# Patient Record
Sex: FEMALE | Race: BLACK OR AFRICAN AMERICAN | Employment: FULL TIME | URBAN - METROPOLITAN AREA
[De-identification: names, ages, dates, MRNs, and addresses within clinical notes are randomized per-mention and may not be internally consistent; named-entity substitution may affect disease eponyms.]

---

## 2021-02-02 NOTE — PERIOP NOTES
[facility]  Preoperative Instructions        Surgery Date 2/12/21          Time of Arrival 1200    1. On the day of your surgery, please report to the Surgical Services Registration Desk and sign in at your designated time. The Surgery Center is located to the right of the Emergency Room. 2. You must have someone with you to drive you home. You should not drive a car for 24 hours following surgery. Please make arrangements for a friend or family member to stay with you for the first 24 hours after your surgery. 3. Do not have anything to eat or drink (including water, gum, mints, coffee, juice) after midnight ? 2/11/21? Jovanni Escalante ? This may not apply to medications prescribed by your physician. ?(Please note below the special instructions with medications to take the morning of your procedure.)    4. We recommend you do not drink any alcoholic beverages for 24 hours before and after your surgery. 5. Contact your surgeons office for instructions on the following medications: non-steroidal anti-inflammatory drugs (i.e. Advil, Aleve), vitamins, and supplements. (Some surgeons will want you to stop these medications prior to surgery and others may allow you to take them)  **If you are currently taking Plavix, Coumadin, Aspirin and/or other blood-thinning agents, contact your surgeon for instructions. ** Your surgeon will partner with the physician prescribing these medications to determine if it is safe to stop or if you need to continue taking. Please do not stop taking these medications without instructions from your surgeon    6. Wear comfortable clothes. Wear glasses instead of contacts. Do not bring any money or jewelry. Please bring picture ID, insurance card, and any prearranged co-payment or hospital payment. Do not wear make-up, particularly mascara the morning of your surgery. Do not wear nail polish, particularly if you are having foot /hand surgery.   Wear your hair loose or down, no ponytails, buns, marquita pins or clips. All body piercings must be removed. Please shower with antibacterial soap for three consecutive days before and on the morning of surgery, but do not apply any lotions, powders or deodorants after the shower on the day of surgery. Please use a fresh towels after each shower. Please sleep in clean clothes and change bed linens the night before surgery. Please do not shave for 48 hours prior to surgery. Shaving of the face is acceptable. 7. You should understand that if you do not follow these instructions your surgery may be cancelled. If your physical condition changes (I.e. fever, cold or flu) please contact your surgeon as soon as possible. 8. It is important that you be on time. If a situation occurs where you may be late, please call [phone] (OR Holding Area). 9. If you have any questions and or problems, please call [phone] (Pre-admission Testing). 10. Your surgery time may be subject to change. You will receive a phone call the evening prior if your time changes. 11.  If having outpatient surgery, you must have someone to drive you here, stay with you during the duration of your stay, and to drive you home at time of discharge. Special Instructions: covid test 2/8 7-11:45 am    TAKE ALL MEDICATIONS DAY OF SURGERY EXCEPT:  n/a    I understand a pre-operative phone call will be made to verify my surgery time. In the event that I am not available, I give permission for a message to be left on my answering service and/or with another person?   Yes [phone]         ___________________      __________   _________    (Signature of Patient)             (Witness)                (Date and Time)

## 2021-02-08 ENCOUNTER — HOSPITAL ENCOUNTER (OUTPATIENT)
Dept: PREADMISSION TESTING | Age: 31
Discharge: HOME | End: 2021-02-08
Payer: COMMERCIAL

## 2021-02-08 LAB — SARS-COV-2, COV2: NORMAL

## 2021-02-08 PROCEDURE — U0003 INFECTIOUS AGENT DETECTION BY NUCLEIC ACID (DNA OR RNA); SEVERE ACUTE RESPIRATORY SYNDROME CORONAVIRUS 2 (SARS-COV-2) (CORONAVIRUS DISEASE [COVID-19]), AMPLIFIED PROBE TECHNIQUE, MAKING USE OF HIGH THROUGHPUT TECHNOLOGIES AS DESCRIBED BY CMS-2020-01-R: HCPCS

## 2021-02-09 LAB — SARS-COV-2, COV2NT: NOT DETECTED

## 2021-02-11 NOTE — H&P
Pre-operative Evaluation / History & Physical    Sent From: Sent To: [facility]  [address], [address]  Phone: [phone] Fax: [phone]      Patient Information  Patient Name Lance Bosch Sex F    1990 Age 32yo   Address [address] [place] [address] Phone H: [phone]  M: [phone]   Primary Insurance [facility] (PP[place])  ID: [id number]  Group: [id number]  Policy [place]: Soy COWAN None recorded. Pre-Op Visit and Medical History  Chief Complaint GYN problem    Pre-op visit   History of Present Illness Rm 8 27year old  presents for pre-op visit. Laparoscopic myomectomy with contained morcellation sched for 21. Pain, pressure, dyspareunia. 4 subserosal and pedunculated fibroids noted. largest fibroids 5.4 cms  Desires future fertility. Has some clotting during menses but they are otherwise not heavy or bothersome. Pain and presure are the more worrisome symptoms. Past Medical History Discussed Past Medical History  Other: Y - Fibroids  Gastrointestinal Disease: Y - Gastritis-gall bladder removed   Surgical History Reviewed Surgical History     Cholecystectomy   Gynecological / Obstetrical History Reviewed GYN History  Date of LMP: 2021. Frequency of Cycle (Q days): (Notes: irregular). Duration of Flow (days): 5. Flow: Moderate (Notes: varies-mostly \"medium flow\" -c/o clots). Menses Monthly: Y. Menstrual Cramps: severe. Date of Last Pap Smear: 2020 (Notes: NIL GC/C Neg around 2019 w/previous gyn [facility]). Date of HPV testin2020 (Notes: Neg). HPV testing results: Negative. Abnormal Pap: N. Age at Menarche: 6. HPV Vaccine: Y (Notes: Complete per pt). Sexually Active?: Y.  STIs/STDs: N.  Current Birth Control Method: Seeking Pregnancy. Endometriosis: N. Fibroids: Y. Ovarian Cyst: Y.   PCOS: N.   Social History Discussed Social History  OB/GYN Social History  Chewing tobacco: none  Tobacco Smoking Status: Never smoker  Non-smoker  Smokeless Tobacco Status: Never used smokeless tobacco  Tobacco-years of use: 0  E-cigarette/Vape Status: Never used electronic cigarettes  Most Recent Tobacco Use Screenin2020  Marital status: Single (Notes: in a relationship)  Number of children: 0  Are you working: Yes  Occupation: works from [employer]  How often do you have a DRINK containing ALCOHOL?: 2-3 times a week (Notes: yes-social drinker/wine 2x weekly)  Illicit drugs: denies   Family History Discussed Family History    Maternal Grandmother - Malignant tumor of breast  - Diagnosis age: 63's   Maternal Aunt - Malignant tumor of breast  - Diagnosis age: mid 42's   Father - Malignant neoplastic disease  - -unsure of type of cancer   Mother - Uterine leiomyoma  - hyst performed   Sister - Uterine leiomyoma  - older sister-hyst performed      Allergies List Reviewed Allergies     NKDA      Medications No medications reported   Review of Systems ROS as noted in the HPI   Vital Signs Ht: 5 ft 1 in (154.94 cm) 2021 09:05 am Wt: 196 lbs Stated (88.9 kg) 2021 09:08 am BMI: 37 2021 09:08 am   BP: 138/80 sitting L arm 2021 09:10 am          Physical Exam Patient is a 72-year-old female. Constitutional: General Appearance: well developed and nourished and pleasant. Level of Distress: no acute distress. Ambulation: ambulating normally. Head: Head: normal scalp and examination of the face and normocephalic, atraumatic, and no temporal wasting. Eyes: External Eye no discharge or eye discharge, proptosis, or ptosis. Sclera: non-icteric. Extraocular Movements extraocular movements intact. Ears, Nose, Mouth, Throat: Ears normal hearing. Nose: no external nose lesions. Neck: Appearance trachea midline. Thyroid: no enlargement. Lungs / Chest: Respiratory effort: unlabored.  Auscultation: no wheezing, rales/crackles, or rhonchi. Cardiovascular: Rate And Rhythm: regular. Heart Sounds: no murmurs. Extremities: no clubbing, cyanosis, or edema. Abdomen: Inspection and Palpation: no tenderness, guarding, masses, or rebound tenderness and soft and non-distended. Hernia: none palpable. Lymphatics: General Lymphatics: no lymphadenopathy. Extremities: Extremities: no swelling or inflammation of extremities. Musculoskeletal System: General Musculoskeletal full range of motion. Skin: General Skin no rash or suspicious lesions. Neurologic: Gait and Station: normal gait and station. Cranial Nerves: grossly intact. Mental Status Exam: Orientation oriented to person, place, and time. Affect: appropriate affect. Language and Speech: normal speech and comprehension. Lab Results    Assessment and Plan 1. Uterine leiomyoma -  We discussed at length her fibroid burden, the mechanical symptoms they are causing, and possible options for therapy, including embolization, Acessa procedure, and myomectomy, which is ultimately what she would like to pursue. I reviewed with the patient indications, alternatives, risks, and benefits of laparoscopic myomectomy, the risks of which include injury to bowel, bladder, nerves, blood vessels, or ureters, injury to any other intraabdominal structure, risk of bleeding and infection, and inherent risks of anesthesia, including death. The patient indicates understanding of these risks, and agrees to the proposed procedure. She is instructed to stay NPO after midnight the night before surgery. We discussed the size of her fibroids and the possible need for morcellation, We discussed the risk of occult sarcoma being 1 in 350 to 1 in 1000. We discussed the technique of contained morcellation and that there is some developing data suggesting this mitigates the risk of dissemination, and that there are FDA approved products for this indication now.   D25.9: Leiomyoma of uterus, unspecified      Return to Office   Precious Hardwick MD for Surgery at [facility] (OP) on 02/12/2021 at 02:00 PM   Precious Hardwick MD for Established Patient at [facility] [facility] on 03/17/2021 at 09:30 AM   Current Problems (Diagnoses) Reviewed Problems     Uterine leiomyoma - Onset: 12/01/2020    laparoscopic myomectomy with contained morcellation / 2- 2pm [facility] main / [place] / [id number] to call pt for PAT / covid testing         Electronically Signed by: Seble Mcgee MD    _____________________________________________  Ordered/Documented by:  Visit Date: 02/03/2021   The History and Physical is reviewed today. The patient is seen and examined and no changes are required.   Chung Oconnell MD  2/12/2021  12:45 PM

## 2021-02-12 ENCOUNTER — ANESTHESIA (OUTPATIENT)
Dept: SURGERY | Age: 31
End: 2021-02-12
Payer: COMMERCIAL

## 2021-02-12 ENCOUNTER — HOSPITAL ENCOUNTER (OUTPATIENT)
Age: 31
Setting detail: OUTPATIENT SURGERY
Discharge: HOME | End: 2021-02-12
Attending: OBSTETRICS & GYNECOLOGY | Admitting: OBSTETRICS & GYNECOLOGY
Payer: COMMERCIAL

## 2021-02-12 ENCOUNTER — ANESTHESIA EVENT (OUTPATIENT)
Dept: SURGERY | Age: 31
End: 2021-02-12
Payer: COMMERCIAL

## 2021-02-12 VITALS
OXYGEN SATURATION: 100 % | SYSTOLIC BLOOD PRESSURE: 118 MMHG | DIASTOLIC BLOOD PRESSURE: 74 MMHG | WEIGHT: 197.09 LBS | TEMPERATURE: 98.2 F | RESPIRATION RATE: 13 BRPM | BODY MASS INDEX: 37.21 KG/M2 | HEART RATE: 75 BPM | HEIGHT: 61 IN

## 2021-02-12 DIAGNOSIS — D21.9 FIBROIDS: Primary | ICD-10-CM

## 2021-02-12 LAB
GLUCOSE BLD STRIP.AUTO-MCNC: 86 MG/DL (ref 65–100)
HCG UR QL: NEGATIVE
SERVICE CMNT-IMP: NORMAL

## 2021-02-12 PROCEDURE — 76010000133 HC OR TIME 3 TO 3.5 HR: Performed by: OBSTETRICS & GYNECOLOGY

## 2021-02-12 PROCEDURE — 74011250636 HC RX REV CODE- 250/636: Performed by: ANESTHESIOLOGY

## 2021-02-12 PROCEDURE — 77030008684 HC TU ET CUF COVD -B

## 2021-02-12 PROCEDURE — 77030002933 HC SUT MCRYL J&J -A: Performed by: OBSTETRICS & GYNECOLOGY

## 2021-02-12 PROCEDURE — 76210000016 HC OR PH I REC 1 TO 1.5 HR: Performed by: OBSTETRICS & GYNECOLOGY

## 2021-02-12 PROCEDURE — 77030008756 HC TU IRR SUC STRY -B: Performed by: OBSTETRICS & GYNECOLOGY

## 2021-02-12 PROCEDURE — 77030013079 HC BLNKT BAIR HGGR 3M -A

## 2021-02-12 PROCEDURE — 77030002895 HC DEV VASC CLOSR COVD -B: Performed by: OBSTETRICS & GYNECOLOGY

## 2021-02-12 PROCEDURE — 74011250636 HC RX REV CODE- 250/636: Performed by: OBSTETRICS & GYNECOLOGY

## 2021-02-12 PROCEDURE — 77030005513 HC CATH URETH FOL11 MDII -B: Performed by: OBSTETRICS & GYNECOLOGY

## 2021-02-12 PROCEDURE — 77030018778 HC MANIP UTER VCAR CNMD -B: Performed by: OBSTETRICS & GYNECOLOGY

## 2021-02-12 PROCEDURE — 74011250636 HC RX REV CODE- 250/636: Performed by: NURSE ANESTHETIST, CERTIFIED REGISTERED

## 2021-02-12 PROCEDURE — 77030008771 HC TU NG SALEM SUMP -A

## 2021-02-12 PROCEDURE — 74011000250 HC RX REV CODE- 250

## 2021-02-12 PROCEDURE — 76210000020 HC REC RM PH II FIRST 0.5 HR: Performed by: OBSTETRICS & GYNECOLOGY

## 2021-02-12 PROCEDURE — 81025 URINE PREGNANCY TEST: CPT

## 2021-02-12 PROCEDURE — C1782 MORCELLATOR: HCPCS | Performed by: OBSTETRICS & GYNECOLOGY

## 2021-02-12 PROCEDURE — 77030008606 HC TRCR ENDOSC KII AMR -B: Performed by: OBSTETRICS & GYNECOLOGY

## 2021-02-12 PROCEDURE — 77030010507 HC ADH SKN DERMBND J&J -B: Performed by: OBSTETRICS & GYNECOLOGY

## 2021-02-12 PROCEDURE — 74011250636 HC RX REV CODE- 250/636

## 2021-02-12 PROCEDURE — C1765 ADHESION BARRIER: HCPCS | Performed by: OBSTETRICS & GYNECOLOGY

## 2021-02-12 PROCEDURE — 74011000250 HC RX REV CODE- 250: Performed by: NURSE ANESTHETIST, CERTIFIED REGISTERED

## 2021-02-12 PROCEDURE — 76060000037 HC ANESTHESIA 3 TO 3.5 HR: Performed by: OBSTETRICS & GYNECOLOGY

## 2021-02-12 PROCEDURE — 74011250637 HC RX REV CODE- 250/637: Performed by: OBSTETRICS & GYNECOLOGY

## 2021-02-12 PROCEDURE — 77030039678 HC DEV TISSUE EXTRACT CONT SYST DISP OCOA -I: Performed by: OBSTETRICS & GYNECOLOGY

## 2021-02-12 PROCEDURE — 77030018684: Performed by: OBSTETRICS & GYNECOLOGY

## 2021-02-12 PROCEDURE — 74011000250 HC RX REV CODE- 250: Performed by: ANESTHESIOLOGY

## 2021-02-12 PROCEDURE — 2709999900 HC NON-CHARGEABLE SUPPLY: Performed by: OBSTETRICS & GYNECOLOGY

## 2021-02-12 PROCEDURE — 77030016151 HC PROTCTR LNS DFOG COVD -B: Performed by: OBSTETRICS & GYNECOLOGY

## 2021-02-12 PROCEDURE — 88305 TISSUE EXAM BY PATHOLOGIST: CPT

## 2021-02-12 PROCEDURE — 77030031139 HC SUT VCRL2 J&J -A: Performed by: OBSTETRICS & GYNECOLOGY

## 2021-02-12 PROCEDURE — 77030018836 HC SOL IRR NACL ICUM -A: Performed by: OBSTETRICS & GYNECOLOGY

## 2021-02-12 PROCEDURE — 77030038613 HC SUT PDS STRATA SPIRL J&J -B: Performed by: OBSTETRICS & GYNECOLOGY

## 2021-02-12 PROCEDURE — 82962 GLUCOSE BLOOD TEST: CPT

## 2021-02-12 PROCEDURE — 77030026438 HC STYL ET INTUB CARD -A

## 2021-02-12 RX ORDER — GLYCOPYRROLATE 0.2 MG/ML
INJECTION INTRAMUSCULAR; INTRAVENOUS AS NEEDED
Status: DISCONTINUED | OUTPATIENT
Start: 2021-02-12 | End: 2021-02-12 | Stop reason: HOSPADM

## 2021-02-12 RX ORDER — BUPIVACAINE HYDROCHLORIDE 2.5 MG/ML
INJECTION, SOLUTION EPIDURAL; INFILTRATION; INTRACAUDAL AS NEEDED
Status: DISCONTINUED | OUTPATIENT
Start: 2021-02-12 | End: 2021-02-12 | Stop reason: HOSPADM

## 2021-02-12 RX ORDER — SODIUM CHLORIDE 9 MG/ML
INJECTION, SOLUTION INTRAVENOUS
Status: COMPLETED | OUTPATIENT
Start: 2021-02-12 | End: 2021-02-12

## 2021-02-12 RX ORDER — SODIUM CHLORIDE 0.9 % (FLUSH) 0.9 %
5-40 SYRINGE (ML) INJECTION AS NEEDED
Status: DISCONTINUED | OUTPATIENT
Start: 2021-02-12 | End: 2021-02-12 | Stop reason: HOSPADM

## 2021-02-12 RX ORDER — SODIUM CHLORIDE 0.9 % (FLUSH) 0.9 %
5-40 SYRINGE (ML) INJECTION EVERY 8 HOURS
Status: DISCONTINUED | OUTPATIENT
Start: 2021-02-12 | End: 2021-02-12 | Stop reason: HOSPADM

## 2021-02-12 RX ORDER — SODIUM CHLORIDE, SODIUM LACTATE, POTASSIUM CHLORIDE, CALCIUM CHLORIDE 600; 310; 30; 20 MG/100ML; MG/100ML; MG/100ML; MG/100ML
25 INJECTION, SOLUTION INTRAVENOUS CONTINUOUS
Status: DISCONTINUED | OUTPATIENT
Start: 2021-02-12 | End: 2021-02-12 | Stop reason: HOSPADM

## 2021-02-12 RX ORDER — FENTANYL CITRATE 50 UG/ML
INJECTION, SOLUTION INTRAMUSCULAR; INTRAVENOUS AS NEEDED
Status: DISCONTINUED | OUTPATIENT
Start: 2021-02-12 | End: 2021-02-12 | Stop reason: HOSPADM

## 2021-02-12 RX ORDER — LIDOCAINE HYDROCHLORIDE 20 MG/ML
INJECTION, SOLUTION EPIDURAL; INFILTRATION; INTRACAUDAL; PERINEURAL AS NEEDED
Status: DISCONTINUED | OUTPATIENT
Start: 2021-02-12 | End: 2021-02-12 | Stop reason: HOSPADM

## 2021-02-12 RX ORDER — OXYCODONE HYDROCHLORIDE 5 MG/1
5 TABLET ORAL
Qty: 20 TAB | Refills: 0 | Status: SHIPPED | OUTPATIENT
Start: 2021-02-12 | End: 2021-02-15

## 2021-02-12 RX ORDER — PHENYLEPHRINE HCL IN 0.9% NACL 0.4MG/10ML
SYRINGE (ML) INTRAVENOUS AS NEEDED
Status: DISCONTINUED | OUTPATIENT
Start: 2021-02-12 | End: 2021-02-12 | Stop reason: HOSPADM

## 2021-02-12 RX ORDER — MIDAZOLAM HYDROCHLORIDE 1 MG/ML
INJECTION, SOLUTION INTRAMUSCULAR; INTRAVENOUS AS NEEDED
Status: DISCONTINUED | OUTPATIENT
Start: 2021-02-12 | End: 2021-02-12 | Stop reason: HOSPADM

## 2021-02-12 RX ORDER — ONDANSETRON 2 MG/ML
4 INJECTION INTRAMUSCULAR; INTRAVENOUS AS NEEDED
Status: DISCONTINUED | OUTPATIENT
Start: 2021-02-12 | End: 2021-02-12 | Stop reason: HOSPADM

## 2021-02-12 RX ORDER — NEOSTIGMINE METHYLSULFATE 1 MG/ML
INJECTION, SOLUTION INTRAVENOUS AS NEEDED
Status: DISCONTINUED | OUTPATIENT
Start: 2021-02-12 | End: 2021-02-12 | Stop reason: HOSPADM

## 2021-02-12 RX ORDER — ONDANSETRON 2 MG/ML
INJECTION INTRAMUSCULAR; INTRAVENOUS AS NEEDED
Status: DISCONTINUED | OUTPATIENT
Start: 2021-02-12 | End: 2021-02-12 | Stop reason: HOSPADM

## 2021-02-12 RX ORDER — VASOPRESSIN 20 U/ML
INJECTION PARENTERAL AS NEEDED
Status: DISCONTINUED | OUTPATIENT
Start: 2021-02-12 | End: 2021-02-12 | Stop reason: HOSPADM

## 2021-02-12 RX ORDER — KETOROLAC TROMETHAMINE 30 MG/ML
INJECTION, SOLUTION INTRAMUSCULAR; INTRAVENOUS AS NEEDED
Status: DISCONTINUED | OUTPATIENT
Start: 2021-02-12 | End: 2021-02-12 | Stop reason: HOSPADM

## 2021-02-12 RX ORDER — PROPOFOL 10 MG/ML
INJECTION, EMULSION INTRAVENOUS AS NEEDED
Status: DISCONTINUED | OUTPATIENT
Start: 2021-02-12 | End: 2021-02-12 | Stop reason: HOSPADM

## 2021-02-12 RX ORDER — SUCCINYLCHOLINE CHLORIDE 20 MG/ML
INJECTION INTRAMUSCULAR; INTRAVENOUS AS NEEDED
Status: DISCONTINUED | OUTPATIENT
Start: 2021-02-12 | End: 2021-02-12 | Stop reason: HOSPADM

## 2021-02-12 RX ORDER — ROCURONIUM BROMIDE 10 MG/ML
INJECTION, SOLUTION INTRAVENOUS AS NEEDED
Status: DISCONTINUED | OUTPATIENT
Start: 2021-02-12 | End: 2021-02-12 | Stop reason: HOSPADM

## 2021-02-12 RX ORDER — HYDROMORPHONE HYDROCHLORIDE 2 MG/ML
INJECTION, SOLUTION INTRAMUSCULAR; INTRAVENOUS; SUBCUTANEOUS AS NEEDED
Status: DISCONTINUED | OUTPATIENT
Start: 2021-02-12 | End: 2021-02-12 | Stop reason: HOSPADM

## 2021-02-12 RX ORDER — DEXMEDETOMIDINE HYDROCHLORIDE 100 UG/ML
INJECTION, SOLUTION INTRAVENOUS AS NEEDED
Status: DISCONTINUED | OUTPATIENT
Start: 2021-02-12 | End: 2021-02-12 | Stop reason: HOSPADM

## 2021-02-12 RX ORDER — FENTANYL CITRATE 50 UG/ML
25 INJECTION, SOLUTION INTRAMUSCULAR; INTRAVENOUS
Status: DISCONTINUED | OUTPATIENT
Start: 2021-02-12 | End: 2021-02-12 | Stop reason: HOSPADM

## 2021-02-12 RX ORDER — DEXAMETHASONE SODIUM PHOSPHATE 4 MG/ML
INJECTION, SOLUTION INTRA-ARTICULAR; INTRALESIONAL; INTRAMUSCULAR; INTRAVENOUS; SOFT TISSUE AS NEEDED
Status: DISCONTINUED | OUTPATIENT
Start: 2021-02-12 | End: 2021-02-12 | Stop reason: HOSPADM

## 2021-02-12 RX ORDER — ONDANSETRON 4 MG/1
4 TABLET, ORALLY DISINTEGRATING ORAL
Qty: 20 TAB | Refills: 5 | Status: SHIPPED | OUTPATIENT
Start: 2021-02-12 | End: 2022-02-07

## 2021-02-12 RX ORDER — IBUPROFEN 800 MG/1
TABLET ORAL
Qty: 30 TAB | Refills: 1 | Status: SHIPPED | OUTPATIENT
Start: 2021-02-12

## 2021-02-12 RX ADMIN — DEXMEDETOMIDINE HYDROCHLORIDE 6 MCG: 100 INJECTION, SOLUTION, CONCENTRATE INTRAVENOUS at 14:05

## 2021-02-12 RX ADMIN — DEXMEDETOMIDINE HYDROCHLORIDE 6 MCG: 100 INJECTION, SOLUTION, CONCENTRATE INTRAVENOUS at 15:10

## 2021-02-12 RX ADMIN — HYDROMORPHONE HYDROCHLORIDE 0.5 MG: 2 INJECTION, SOLUTION INTRAMUSCULAR; INTRAVENOUS; SUBCUTANEOUS at 15:51

## 2021-02-12 RX ADMIN — FENTANYL CITRATE 50 MCG: 50 INJECTION, SOLUTION INTRAMUSCULAR; INTRAVENOUS at 14:33

## 2021-02-12 RX ADMIN — DEXMEDETOMIDINE HYDROCHLORIDE 10 MCG: 100 INJECTION, SOLUTION, CONCENTRATE INTRAVENOUS at 16:31

## 2021-02-12 RX ADMIN — KETOROLAC TROMETHAMINE 30 MG: 30 INJECTION, SOLUTION INTRAMUSCULAR; INTRAVENOUS at 16:30

## 2021-02-12 RX ADMIN — Medication 3 AMPULE: at 12:30

## 2021-02-12 RX ADMIN — ROCURONIUM BROMIDE 10 MG: 10 INJECTION INTRAVENOUS at 15:46

## 2021-02-12 RX ADMIN — ROCURONIUM BROMIDE 10 MG: 10 INJECTION INTRAVENOUS at 13:35

## 2021-02-12 RX ADMIN — PROPOFOL 20 MG: 10 INJECTION, EMULSION INTRAVENOUS at 14:59

## 2021-02-12 RX ADMIN — GLYCOPYRROLATE 0.4 MG: 0.2 INJECTION, SOLUTION INTRAMUSCULAR; INTRAVENOUS at 16:38

## 2021-02-12 RX ADMIN — FENTANYL CITRATE 50 MCG: 50 INJECTION, SOLUTION INTRAMUSCULAR; INTRAVENOUS at 13:39

## 2021-02-12 RX ADMIN — FENTANYL CITRATE 50 MCG: 50 INJECTION, SOLUTION INTRAMUSCULAR; INTRAVENOUS at 13:35

## 2021-02-12 RX ADMIN — ONDANSETRON HYDROCHLORIDE 4 MG: 2 INJECTION, SOLUTION INTRAMUSCULAR; INTRAVENOUS at 13:49

## 2021-02-12 RX ADMIN — SODIUM CHLORIDE, POTASSIUM CHLORIDE, SODIUM LACTATE AND CALCIUM CHLORIDE: 600; 310; 30; 20 INJECTION, SOLUTION INTRAVENOUS at 15:47

## 2021-02-12 RX ADMIN — MIDAZOLAM HYDROCHLORIDE 2 MG: 1 INJECTION, SOLUTION INTRAMUSCULAR; INTRAVENOUS at 13:28

## 2021-02-12 RX ADMIN — SODIUM CHLORIDE, POTASSIUM CHLORIDE, SODIUM LACTATE AND CALCIUM CHLORIDE 25 ML/HR: 600; 310; 30; 20 INJECTION, SOLUTION INTRAVENOUS at 12:51

## 2021-02-12 RX ADMIN — DEXAMETHASONE SODIUM PHOSPHATE 8 MG: 4 INJECTION, SOLUTION INTRAMUSCULAR; INTRAVENOUS at 13:49

## 2021-02-12 RX ADMIN — BUPIVACAINE HYDROCHLORIDE 30 ML: 2.5 INJECTION, SOLUTION EPIDURAL; INFILTRATION; INTRACAUDAL at 13:41

## 2021-02-12 RX ADMIN — LIDOCAINE HYDROCHLORIDE 40 MG: 20 INJECTION, SOLUTION EPIDURAL; INFILTRATION; INTRACAUDAL; PERINEURAL at 13:35

## 2021-02-12 RX ADMIN — ONDANSETRON HYDROCHLORIDE 4 MG: 2 INJECTION, SOLUTION INTRAMUSCULAR; INTRAVENOUS at 16:30

## 2021-02-12 RX ADMIN — Medication 80 MCG: at 16:10

## 2021-02-12 RX ADMIN — PROPOFOL 160 MG: 10 INJECTION, EMULSION INTRAVENOUS at 13:35

## 2021-02-12 RX ADMIN — FENTANYL CITRATE 50 MCG: 50 INJECTION, SOLUTION INTRAMUSCULAR; INTRAVENOUS at 14:16

## 2021-02-12 RX ADMIN — SUCCINYLCHOLINE CHLORIDE 120 MG: 20 INJECTION, SOLUTION INTRAMUSCULAR; INTRAVENOUS at 13:35

## 2021-02-12 RX ADMIN — Medication 5 MG: at 16:38

## 2021-02-12 RX ADMIN — BUPIVACAINE HYDROCHLORIDE 30 ML: 2.5 INJECTION, SOLUTION EPIDURAL; INFILTRATION; INTRACAUDAL at 13:40

## 2021-02-12 RX ADMIN — ROCURONIUM BROMIDE 10 MG: 10 INJECTION INTRAVENOUS at 15:02

## 2021-02-12 RX ADMIN — DEXMEDETOMIDINE HYDROCHLORIDE 8 MCG: 100 INJECTION, SOLUTION, CONCENTRATE INTRAVENOUS at 16:25

## 2021-02-12 RX ADMIN — ROCURONIUM BROMIDE 30 MG: 10 INJECTION INTRAVENOUS at 13:44

## 2021-02-12 NOTE — PERIOP NOTES
1703-Handoff Report from Operating Room to PACU    Report received from [address] [place] [address] and R Bashir DONALD regarding [employer]. Surgeon(s):  Najma Franco MD  And Procedure(s) (LRB):  LAPAROSCOPIC MULTIPLE MYOMECTOMY, EXCISION OF ENDOMETRIOSIS WITH CONTAINED MORCELLATION (N/A)  confirmed   with allergies, drains and dressings discussed. Anesthesia type, drugs, patient history, complications, estimated blood loss, vital signs, intake and output, and last pain medication, lines and reversal medications were reviewed. [address] from Nurse    PATIENT INSTRUCTIONS:    After general anesthesia or intravenous sedation, for 24 hours or while taking prescription Narcotics:  · Limit your activities  · Do not drive and operate hazardous machinery  · Do not make important personal or business decisions  · Do  not drink alcoholic beverages  · If you have not urinated within 8 hours after discharge, please contact your surgeon on call. Report the following to your surgeon:  · Excessive pain, swelling, redness or odor of or around the surgical area  · Temperature over 100.5  · Nausea and vomiting lasting longer than 4 hours or if unable to take medications  · Any signs of decreased circulation or nerve impairment to extremity: change in color, persistent  numbness, tingling, coldness or increase pain  · Any questions      These are general instructions for a healthy lifestyle:    No smoking/ No tobacco products/ Avoid exposure to second hand smoke  Surgeon General's Warning:  Quitting smoking now greatly reduces serious risk to your health.     Obesity, smoking, and sedentary lifestyle greatly increases your risk for illness    A healthy diet, regular physical exercise & weight monitoring are important for maintaining a healthy lifestyle    You may be retaining fluid if you have a history of heart failure or if you experience any of the following symptoms:  Weight gain of 3 pounds or more overnight or 5 pounds in a week, increased swelling in our hands or feet or shortness of breath while lying flat in bed. Please call your doctor as soon as you notice any of these symptoms; do not wait until your next office visit. The discharge information has been reviewed with the patient and parent. The patient and parent verbalized understanding. Discharge medications reviewed with the patient and parent and appropriate educational materials and side effects teaching were provided.   ___________________________________________________________________________________________________________________________________

## 2021-02-12 NOTE — OP NOTES
Gynecologic Laparoscopy Procedure Note    Name: Eli Galo   Medical Record Number: [id number]   YOB: 1990  Date: 2/12/2021      Preoperative Diagnosis:   FIBROIDS  PELVIC PAIN    Postoperative Diagnosis: FIBROIDS  PELVIC PAIN, ENDOMETRIOSIS    Surgeon: Ana Contreras MD    Assistant: Jaclyn Torres    Anesthesia: General    Procedure: Procedure(s):  LAPAROSCOPIC MULTIPLE MYOMECTOMY, EXCISION OF ENDOMETRIOSIS WITH CONTAINED MORCELLATION    Estimated Blood Loss: 100 cc    Pathology /Specimens:   ID Type Source Tests Collected by Time Destination   1 : Fibroids Preservative Uterus  Michael Sosa MD 2/12/2021 1534 Pathology   2 : LEFT UTEROSACRAL LIGAMENT Preservative Uterus  Michael Sosa MD 2/12/2021 1557 Pathology       Complications: None    Findings: Multiple (12) fibroids ranging from 1.5 cm to 6 cm in size. There are 3 on the anterior uterus, 2 on the left uterine wall, several posterior and right side as well. Subserosal and intramural.  Normal tubes and ovaries. There is a small isolated nidus of endometriosis on the left uterosacral ligament. Procedure in Detail:  The patient was taken to the operating room where she was placed in the supine position. After undergoing adequate general endotracheal anesthesia, a TAP block was placed by Dr. Arnie Arellano. She was placed in the [address] [place] [address] in the dorsal lithotomy position. Both arms were tucked to the side. The patient was then prepped and draped in the usual fashion and bingham catheter was placed into the bladder. Attention was first turned to the vagina where the speculum was placed in the vagina. The anterior lip of the cervix was grasped with a single-toothed tenaculum. VCare uterine manipulator is placed. All other instruments were removed from the vagina and 's gloves were changed. Attention was then turned to the abdomen. An incision was made at Zaragoza's point.   A 5mm optical trocar was placed directly into the abdomen. There was no evidence of bowel injury or bleeding. Pneumoperitoneum was obtained. Two accessory 5 mm ports were placed in the right upper and lower quadrants in the same fashion under direct visualization. A final 10-12 trocar is placed in the LLQ. The above findings were noted. Ureters were identified. Dilute vasopressin was injected in the anterior uterine serosa over the fibroids, and the serosa is opened with the Harmonic HD. The fibroids are shelled out and their vascular supply controlled and they are removed and placed in the pelvis. This procedure was repeated on the left side and then along the fundal posterior with an incision stretching from cornu to cornu. All fibroids were removed through these three incisions with out difficulty. Once the final fibroids was removed, attention was turned to the closure of the incisions. 2-0 Stratafix is used to effect closure of each incision in multiple layers, three on the anterior and left, and four on the posterior-fundal.  Hemostasis is excellent. The HD is used to excise the endometriosis lesion on the USL, which is handed off for permanent. The LLQ port is removed and the Olympus CTE single port is placed in the usual fashion. The pneumoliner bag was introduced and deployed, and the fibroids are placed into the bag. Previous count was maintained so that no fibroids were left behind. All 12 had been valdemar-chained together with a suture and all 12 were identified in the bag. The bag edges are withdrawn and the single port cap is placed over the bag. The bag is then insufflated and the fibroids are identified in the bag. The Sandoval Ruanoon morcellator is placed and morcellation of the fibroids is carried out. Once all portions remaining were small enough, the bag was desufflated and removed. Copious irrigation was performed. Interceed was placed over the incisions. The LLQ port is removed and the fascia closed with 0 vicryl.   All instruments were removed and CO2 gas was suctioned out. There was good hemostasis. Skin incisions were closed with subcuticular 4-0 monocril followed by Dermabond on the skin. All instruments were removed from the vagina. The patient was awakened, extubated and taken to the recovery room in good condition.

## 2021-02-12 NOTE — ANESTHESIA POSTPROCEDURE EVALUATION
Procedure(s):  LAPAROSCOPIC MULTIPLE MYOMECTOMY, EXCISION OF ENDOMETRIOSIS WITH CONTAINED MORCELLATION. general, regional    Anesthesia Post Evaluation        Patient location during evaluation: PACU  Note status: Adequate. Level of consciousness: responsive to verbal stimuli and sleepy but conscious  Pain management: satisfactory to patient  Airway patency: patent  Anesthetic complications: no  Cardiovascular status: acceptable  Respiratory status: acceptable  Hydration status: acceptable  Comments: +Post-Anesthesia Evaluation and Assessment    Patient: Yamilet Weinberg MRN: [id number]  SSN: [id number]   YOB: 1990  Age: 27 y.o. Sex: female      Cardiovascular Function/Vital Signs    /69   Pulse 65   Temp 36.8 °C (98.2 °F)   Resp 13   Ht 5' 1\" (1.549 m)   Wt 89.4 kg (197 lb 1.5 oz)   SpO2 100%   BMI 37.24 kg/m²     Patient is status post Procedure(s):  LAPAROSCOPIC MULTIPLE MYOMECTOMY, EXCISION OF ENDOMETRIOSIS WITH CONTAINED MORCELLATION. Nausea/Vomiting: Controlled. Postoperative hydration reviewed and adequate. Pain:  Pain Scale 1: Numeric (0 - 10) (02/12/21 1730)  Pain Intensity 1: 0 (02/12/21 1730)   Managed. Neurological Status:   Neuro (WDL): Exceptions to WDL (02/12/21 1702)   At baseline. Mental Status and Level of Consciousness: Arousable. Pulmonary Status:   O2 Device: Nasal cannula (02/12/21 1730)   Adequate oxygenation and airway patent. Complications related to anesthesia: None    Post-anesthesia assessment completed. No concerns. Signed By: Ed Martinez MD    2/12/2021  Post anesthesia nausea and vomiting:  controlled      INITIAL Post-op Vital signs:   Vitals Value Taken Time   /69 02/12/21 1745   Temp 36.8 °C (98.2 °F) 02/12/21 1703   Pulse 66 02/12/21 1750   Resp 13 02/12/21 1750   SpO2 100 % 02/12/21 1750   Vitals shown include unvalidated device data.

## 2021-02-12 NOTE — ANESTHESIA PREPROCEDURE EVALUATION
Relevant Problems   No relevant active problems       Anesthetic History   No history of anesthetic complications            Review of Systems / Medical History  Patient summary reviewed and pertinent labs reviewed    Pulmonary            Asthma : well controlled       Neuro/Psych   Within defined limits           Cardiovascular  Within defined limits                Exercise tolerance: >4 METS     GI/Hepatic/Renal  Within defined limits              Endo/Other  Within defined limits           Other Findings   Comments: Prediabetes  gallstones         Physical Exam    Airway  Mallampati: II  TM Distance: > 6 cm  Neck ROM: normal range of motion   Mouth opening: Normal     Cardiovascular  Regular rate and rhythm,  S1 and S2 normal,  no murmur, click, rub, or gallop  Rhythm: regular  Rate: normal         Dental  No notable dental hx       Pulmonary  Breath sounds clear to auscultation               Abdominal  GI exam deferred       Other Findings            Anesthetic Plan    ASA: 2  Anesthesia type: general, regional and total IV anesthesia - TAP block    Monitoring Plan: BIS  Post-op pain plan if not by surgeon: peripheral nerve block single    Induction: Intravenous  Anesthetic plan and risks discussed with: Patient

## 2021-02-12 NOTE — DISCHARGE INSTRUCTIONS
After Care Instructions For Your Laparoscopy      1. You may resume your usual diet once the nausea resolves. Initially, try sips of warm fluids and a bland diet. 2. Avoid heavy lifting or straining. Gradually increase your activity. First try walking and doing light activity around the house. You may resume your normal habits if no significant discomfort or bleeding develops. Most women can return to work within 2-7 days after this procedure. 3. Sexual intercourse can be resumed as soon as desired provided the discomfort following surgery has subsided. 4. You may take showers or tub baths. It is also safe to swim or use hot tubs once you feel up to it. 5. Some lower abdominal cramping typically occurs after this procedure. Tylenol, Motrin or your prescribed pain medication should relieve this discomfort. You should notice steady improvement in the pain over the next day or so. It is also not unusual to experience some discomfort under your ribs or to notice neck or shoulder soreness. This is due to gas used during the surgery to help the physicians see your pelvic organs. The gas is reabsorbed over a 24 hour course. 6. It is not unusual to have vaginal spotting lasting 2-3 days. It is difficult to predict when your next menstrual period will occur as your body has undergone a major stress. Your period should regulate itself within the first 6 weeks post-op. 7. A bruise may appear around the incision and will gradually resolve as it heals. 8. The suture used to close the incision may be visible above the skin. If so, it will be removed at your office visit. However, frequently sutures are placed below the skin and will reabsorb on their own.   There will be no need for removal.     9. Call the office at [phone] to report any of the following problems: Abdominal pain that is increasing in severity, heavy vaginal bleeding, drainage or separation of your incision site, temperature greater than 100.4 or persistent nausea and vomiting. 10. You should be seen in the office following your surgery. Call for an appointment if this has not already been arranged. At this appointment, the findings noted at the time of your procedure will be discussed. 11. You may remove the dressing from your incision after 12 hours. Patient Education      Ibuprofen (Advil, Advil Children's, Motrin, Children's Ibuprofen) - (By mouth)   Why this medicine is used:   Treats pain and fever. This medicine is an NSAID. Contact a nurse or doctor right away if you have:  · Change in how much or how often you urinate  · Severe stomach pain, vomiting blood, bloody or black tarry stools  · Swelling in your hands, ankles, or feet; rapid weight gain     Common side effects:  · Constipation, diarrhea, gas, mild upset stomach  · Ringing in your ears, dizziness, headache  © 2017 [address] is for End User's use only and may not be sold, redistributed or otherwise used for commercial purposes. Patient Education      Ondansetron (Zofran, Zofran ODT, Sim Fern) - (By mouth, Into the mouth)   Why this medicine is used:   Prevents nausea and vomiting. Contact a nurse or doctor right away if you have:  · Fast, pounding, or uneven heartbeat  · Lightheadedness or fainting  · Trouble breathing     Common side effects:  · Headache, tiredness  · Constipation, diarrhea  © 2017 [address]  Information is for End User's use only and may not be sold, redistributed or otherwise used for commercial purposes. Patient Education      Oxycodone, Rapid Release (ETH-Oxydose, Oxy IR, Roxicodone) - (By mouth)   Why this medicine is used:   Treats moderate to severe pain. This medicine is a narcotic pain reliever.   Contact a nurse or doctor right away if you have:  · Fast or slow heart beat, shallow breathing, blue lips, skin or fingernails  · Anxiety, restlessness, fever, sweating, muscle spasms, twitching, seeing or hearing things that are not there  · Extreme weakness, shallow breathing, slow heartbeat  · Severe confusion, lightheadedness, dizziness, fainting  · Sweating or cold, clammy skin, seizures  · Severe constipation, stomach pain, nausea, vomiting     Common side effects:  · Mild constipation  · Sleepiness, tiredness  © 2017 [facility][note 3] Information is for End User's use only and may not be sold, redistributed or otherwise used for commercial purposes. How to Care for Your Wound After Its Treated With  DERMABOND* Topical Skin Adhesive  DERMABOND* Topical Skin Adhesive (2-octyl cyanoacrylate) is a sterile, liquid skin adhesive  that holds wound edges together. The film will usually remain in place for 5 to 10 days, then  naturally fall off your skin. The following will answer some of your questions and provide instructions for proper care for your  wound while it is healing:    CHECK WOUND APPEARANCE   Some swelling, redness, and pain are common with all wounds and normally will go away as the  wound heals. If swelling, redness, or pain increases or if the wound feels warm to the touch,  contact a doctor. Also contact a doctor if the wound edges reopen or separate. REPLACE BANDAGES   If your wound is bandaged, keep the bandage dry.  Replace the dressing daily until the adhesive film has fallen off or if the  bandage should become wet, unless otherwise instructed by your  physician.  When changing the dressing, do not place tape directly over the  DERMABOND adhesive film, because removing the tape later may also  remove the film. AVOID TOPICAL MEDICATIONS   Do not apply liquid or ointment medications or any other product to your wound while the  DERMABOND adhesive film is in place. These may loosen the film before your wound is healed.   KEEP WOUND DRY AND PROTECTED   You may occasionally and briefly wet your wound in the shower or bath. Do not soak or scrub  your wound, do not swim, and avoid periods of heavy perspiration until the DERMABOND  adhesive has naturally fallen off. After showering or bathing, gently blot your wound dry with a  soft towel. If a protective dressing is being used, apply a fresh, dry bandage, being sure to keep  the tape off the DERMABOND adhesive film.  Apply a clean, dry bandage over the wound if necessary to protect it.  Protect your wound from injury until the skin has had sufficient time to heal.   Do not scratch, rub, or pick at the DERMABOND adhesive film. This may loosen the film before  your wound is healed.  Protect the wound from prolonged exposure to sunlight or tanning lamps while the film is in  place. If you have any questions or concerns about this product, please consult your doctor. *Trademark ©[employer] 2002      DISCHARGE SUMMARY from Nurse    PATIENT INSTRUCTIONS:    After general anesthesia or intravenous sedation, for 24 hours or while taking prescription Narcotics:  · Limit your activities  · Do not drive and operate hazardous machinery  · Do not make important personal or business decisions  · Do  not drink alcoholic beverages  · If you have not urinated within 8 hours after discharge, please contact your surgeon on call. Report the following to your surgeon:  · Excessive pain, swelling, redness or odor of or around the surgical area  · Temperature over 100.5  · Nausea and vomiting lasting longer than 4 hours or if unable to take medications  · Any signs of decreased circulation or nerve impairment to extremity: change in color, persistent  numbness, tingling, coldness or increase pain  · Any questions      These are general instructions for a healthy lifestyle:    No smoking/ No tobacco products/ Avoid exposure to second hand smoke  Surgeon General's Warning:  Quitting smoking now greatly reduces serious risk to your health.     Obesity, smoking, and sedentary lifestyle greatly increases your risk for illness    A healthy diet, regular physical exercise & weight monitoring are important for maintaining a healthy lifestyle    You may be retaining fluid if you have a history of heart failure or if you experience any of the following symptoms:  Weight gain of 3 pounds or more overnight or 5 pounds in a week, increased swelling in our hands or feet or shortness of breath while lying flat in bed. Please call your doctor as soon as you notice any of these symptoms; do not wait until your next office visit.

## 2021-02-12 NOTE — ANESTHESIA PROCEDURE NOTES
Bilateral TAP    Start time: 2/12/2021 1:37 PM  End time: 2/12/2021 1:40 PM  Performed by: Memo Middleton MD  Authorized by: Memo Middleton MD       Pre-procedure:    Indications: at surgeon's request and post-op pain management    Preanesthetic Checklist: patient identified, risks and benefits discussed, site marked, timeout performed, anesthesia consent given and patient being monitored    Timeout Time: 13:37          Block Type:   Block Type:  TAP  Laterality:  Left and right  Monitoring:  Standard ASA monitoring, continuous pulse ox, frequent vital sign checks, heart rate, responsive to questions and oxygen  Injection Technique:  Single shot  Procedures: ultrasound guided    Patient Position: supine  Prep: chlorhexidine    Needle Type:  Stimuplex  Needle Gauge:  20 G  Needle Localization:  Ultrasound guidance and anatomical landmarks    Assessment:  Number of attempts:  1  Injection Assessment:  Incremental injection every 5 mL, ultrasound image on chart, local visualized surrounding nerve on ultrasound, negative aspiration for blood and no intravascular symptoms  Patient tolerance:  Patient tolerated the procedure well with no immediate complications  Excellent visualization on US  60 ml 0.25% bupivacaine administered (30 ml each side)

## 2021-02-12 NOTE — PERIOP NOTES
1227  - PT'S COVID TEST RESULTED NEG - PT STATES HAS QUARANTINED SINCE TESTING. PT DENIES S/S OF COVID - NO FEVER, COLD, COUGH, SOB, N/V, DIARRHEA. .... PRE-OP TCHING DONE - PT VERBALIZES UNDERSTANDING. STRETCHER IN LOWEST POSITION, CB IN PLACE AND SR UP X2.

## 2022-03-19 PROBLEM — D21.9 FIBROIDS: Status: ACTIVE | Noted: 2021-02-12

## 2022-05-16 LAB
ANTIBODY SCREEN, EXTERNAL: NEGATIVE
CHLAMYDIA, EXTERNAL: NEGATIVE
HBSAG, EXTERNAL: NORMAL
HIV, EXTERNAL: NORMAL
N. GONORRHEA, EXTERNAL: NEGATIVE
RPR, EXTERNAL: NORMAL
RUBELLA, EXTERNAL: NORMAL
T. PALLIDUM, EXTERNAL: NORMAL
TYPE, ABO & RH, EXTERNAL: NORMAL

## 2022-11-23 LAB — GRBS, EXTERNAL: POSITIVE

## 2022-12-01 ENCOUNTER — ANESTHESIA (OUTPATIENT)
Dept: LABOR AND DELIVERY | Age: 32
End: 2022-12-01
Payer: COMMERCIAL

## 2022-12-01 ENCOUNTER — ANESTHESIA EVENT (OUTPATIENT)
Dept: LABOR AND DELIVERY | Age: 32
End: 2022-12-01
Payer: COMMERCIAL

## 2022-12-01 ENCOUNTER — HOSPITAL ENCOUNTER (INPATIENT)
Age: 32
LOS: 2 days | Discharge: HOME | End: 2022-12-03
Attending: OBSTETRICS & GYNECOLOGY | Admitting: OBSTETRICS & GYNECOLOGY
Payer: COMMERCIAL

## 2022-12-01 PROBLEM — Z34.90 TERM PREGNANCY: Status: ACTIVE | Noted: 2022-12-01

## 2022-12-01 LAB
ALBUMIN SERPL-MCNC: 3 G/DL (ref 3.5–5)
ALBUMIN/GLOB SERPL: 0.8 {RATIO} (ref 1.1–2.2)
ALP SERPL-CCNC: 149 U/L (ref 45–117)
ALT SERPL-CCNC: 14 U/L (ref 12–78)
ANION GAP SERPL CALC-SCNC: 10 MMOL/L (ref 5–15)
AST SERPL-CCNC: 11 U/L (ref 15–37)
BASOPHILS # BLD: 0.1 K/UL (ref 0–0.1)
BASOPHILS NFR BLD: 1 % (ref 0–1)
BILIRUB SERPL-MCNC: 0.4 MG/DL (ref 0.2–1)
BUN SERPL-MCNC: 5 MG/DL (ref 6–20)
BUN/CREAT SERPL: 7 (ref 12–20)
CALCIUM SERPL-MCNC: 9.4 MG/DL (ref 8.5–10.1)
CHLORIDE SERPL-SCNC: 110 MMOL/L (ref 97–108)
CO2 SERPL-SCNC: 19 MMOL/L (ref 21–32)
CREAT SERPL-MCNC: 0.76 MG/DL (ref 0.55–1.02)
DIFFERENTIAL METHOD BLD: ABNORMAL
EOSINOPHIL # BLD: 0.3 K/UL (ref 0–0.4)
EOSINOPHIL NFR BLD: 3 % (ref 0–7)
ERYTHROCYTE [DISTWIDTH] IN BLOOD BY AUTOMATED COUNT: 14.5 % (ref 11.5–14.5)
GLOBULIN SER CALC-MCNC: 4 G/DL (ref 2–4)
GLUCOSE SERPL-MCNC: 99 MG/DL (ref 65–100)
HCT VFR BLD AUTO: 38.3 % (ref 35–47)
HGB BLD-MCNC: 12.5 G/DL (ref 11.5–16)
IMM GRANULOCYTES # BLD AUTO: 0.1 K/UL (ref 0–0.04)
IMM GRANULOCYTES NFR BLD AUTO: 1 % (ref 0–0.5)
LYMPHOCYTES # BLD: 2.8 K/UL (ref 0.8–3.5)
LYMPHOCYTES NFR BLD: 25 % (ref 12–49)
MCH RBC QN AUTO: 26.3 PG (ref 26–34)
MCHC RBC AUTO-ENTMCNC: 32.6 G/DL (ref 30–36.5)
MCV RBC AUTO: 80.6 FL (ref 80–99)
MONOCYTES # BLD: 0.7 K/UL (ref 0–1)
MONOCYTES NFR BLD: 6 % (ref 5–13)
NEUTS SEG # BLD: 7.4 K/UL (ref 1.8–8)
NEUTS SEG NFR BLD: 64 % (ref 32–75)
NRBC # BLD: 0 K/UL (ref 0–0.01)
NRBC BLD-RTO: 0 PER 100 WBC
PLATELET # BLD AUTO: 354 K/UL (ref 150–400)
PMV BLD AUTO: 9.8 FL (ref 8.9–12.9)
POTASSIUM SERPL-SCNC: 4.2 MMOL/L (ref 3.5–5.1)
PROT SERPL-MCNC: 7 G/DL (ref 6.4–8.2)
RBC # BLD AUTO: 4.75 M/UL (ref 3.8–5.2)
SODIUM SERPL-SCNC: 139 MMOL/L (ref 136–145)
WBC # BLD AUTO: 11.4 K/UL (ref 3.6–11)

## 2022-12-01 PROCEDURE — 76010000391 HC C SECN FIRST 1 HR: Performed by: OBSTETRICS & GYNECOLOGY

## 2022-12-01 PROCEDURE — 77030005513 HC CATH URETH FOL11 MDII -B

## 2022-12-01 PROCEDURE — 77030007866 HC KT SPN ANES BBMI -B: Performed by: NURSE ANESTHETIST, CERTIFIED REGISTERED

## 2022-12-01 PROCEDURE — 74011000250 HC RX REV CODE- 250: Performed by: NURSE ANESTHETIST, CERTIFIED REGISTERED

## 2022-12-01 PROCEDURE — 77030008459 HC STPLR SKN COOP -B

## 2022-12-01 PROCEDURE — 86922 COMPATIBILITY TEST ANTIGLOB: CPT

## 2022-12-01 PROCEDURE — 74011250636 HC RX REV CODE- 250/636: Performed by: OBSTETRICS & GYNECOLOGY

## 2022-12-01 PROCEDURE — 74011250636 HC RX REV CODE- 250/636: Performed by: NURSE ANESTHETIST, CERTIFIED REGISTERED

## 2022-12-01 PROCEDURE — 80053 COMPREHEN METABOLIC PANEL: CPT

## 2022-12-01 PROCEDURE — 74011250636 HC RX REV CODE- 250/636: Performed by: ANESTHESIOLOGY

## 2022-12-01 PROCEDURE — 74011000258 HC RX REV CODE- 258: Performed by: OBSTETRICS & GYNECOLOGY

## 2022-12-01 PROCEDURE — 2709999900 HC NON-CHARGEABLE SUPPLY

## 2022-12-01 PROCEDURE — 86870 RBC ANTIBODY IDENTIFICATION: CPT

## 2022-12-01 PROCEDURE — 65270000029 HC RM PRIVATE

## 2022-12-01 PROCEDURE — 77030040361 HC SLV COMPR DVT MDII -B

## 2022-12-01 PROCEDURE — 86900 BLOOD TYPING SEROLOGIC ABO: CPT

## 2022-12-01 PROCEDURE — 85025 COMPLETE CBC W/AUTO DIFF WBC: CPT

## 2022-12-01 PROCEDURE — 36415 COLL VENOUS BLD VENIPUNCTURE: CPT

## 2022-12-01 PROCEDURE — 86921 COMPATIBILITY TEST INCUBATE: CPT

## 2022-12-01 PROCEDURE — 4A1HXCZ MONITORING OF PRODUCTS OF CONCEPTION, CARDIAC RATE, EXTERNAL APPROACH: ICD-10-PCS | Performed by: OBSTETRICS & GYNECOLOGY

## 2022-12-01 PROCEDURE — 75410000003 HC RECOV DEL/VAG/CSECN EA 0.5 HR: Performed by: OBSTETRICS & GYNECOLOGY

## 2022-12-01 PROCEDURE — 86920 COMPATIBILITY TEST SPIN: CPT

## 2022-12-01 PROCEDURE — 76010000392 HC C SECN EA ADDL 0.5 HR: Performed by: OBSTETRICS & GYNECOLOGY

## 2022-12-01 PROCEDURE — 3E0334Z INTRODUCTION OF SERUM, TOXOID AND VACCINE INTO PERIPHERAL VEIN, PERCUTANEOUS APPROACH: ICD-10-PCS | Performed by: OBSTETRICS & GYNECOLOGY

## 2022-12-01 PROCEDURE — 76060000078 HC EPIDURAL ANESTHESIA: Performed by: OBSTETRICS & GYNECOLOGY

## 2022-12-01 RX ORDER — NALBUPHINE HYDROCHLORIDE 10 MG/ML
10 INJECTION, SOLUTION INTRAMUSCULAR; INTRAVENOUS; SUBCUTANEOUS
Status: DISCONTINUED | OUTPATIENT
Start: 2022-12-01 | End: 2022-12-03

## 2022-12-01 RX ORDER — DIPHENHYDRAMINE HYDROCHLORIDE 50 MG/ML
12.5 INJECTION, SOLUTION INTRAMUSCULAR; INTRAVENOUS
Status: DISCONTINUED | OUTPATIENT
Start: 2022-12-01 | End: 2022-12-03 | Stop reason: HOSPADM

## 2022-12-01 RX ORDER — FOLIC ACID/MULTIVIT,IRON,MINER 0.4MG-18MG
1 TABLET ORAL DAILY
Status: DISCONTINUED | OUTPATIENT
Start: 2022-12-01 | End: 2022-12-03 | Stop reason: HOSPADM

## 2022-12-01 RX ORDER — OXYTOCIN/RINGER'S LACTATE 30/500 ML
87.3 PLASTIC BAG, INJECTION (ML) INTRAVENOUS AS NEEDED
Status: DISCONTINUED | OUTPATIENT
Start: 2022-12-01 | End: 2022-12-03

## 2022-12-01 RX ORDER — KETOROLAC TROMETHAMINE 30 MG/ML
30 INJECTION, SOLUTION INTRAMUSCULAR; INTRAVENOUS
Status: DISCONTINUED | OUTPATIENT
Start: 2022-12-01 | End: 2022-12-03

## 2022-12-01 RX ORDER — BUPIVACAINE HYDROCHLORIDE 7.5 MG/ML
INJECTION, SOLUTION EPIDURAL; RETROBULBAR AS NEEDED
Status: DISCONTINUED | OUTPATIENT
Start: 2022-12-01 | End: 2022-12-01 | Stop reason: HOSPADM

## 2022-12-01 RX ORDER — SODIUM CHLORIDE, SODIUM LACTATE, POTASSIUM CHLORIDE, CALCIUM CHLORIDE 600; 310; 30; 20 MG/100ML; MG/100ML; MG/100ML; MG/100ML
1000 INJECTION, SOLUTION INTRAVENOUS CONTINUOUS
Status: DISCONTINUED | OUTPATIENT
Start: 2022-12-01 | End: 2022-12-01 | Stop reason: HOSPADM

## 2022-12-01 RX ORDER — OXYCODONE AND ACETAMINOPHEN 5; 325 MG/1; MG/1
1 TABLET ORAL
Status: DISCONTINUED | OUTPATIENT
Start: 2022-12-01 | End: 2022-12-03 | Stop reason: HOSPADM

## 2022-12-01 RX ORDER — OXYTOCIN/RINGER'S LACTATE 30/500 ML
10 PLASTIC BAG, INJECTION (ML) INTRAVENOUS AS NEEDED
Status: DISCONTINUED | OUTPATIENT
Start: 2022-12-01 | End: 2022-12-03

## 2022-12-01 RX ORDER — SODIUM CHLORIDE 0.9 % (FLUSH) 0.9 %
5-40 SYRINGE (ML) INJECTION EVERY 8 HOURS
Status: DISCONTINUED | OUTPATIENT
Start: 2022-12-01 | End: 2022-12-03

## 2022-12-01 RX ORDER — SODIUM CHLORIDE 0.9 % (FLUSH) 0.9 %
5-40 SYRINGE (ML) INJECTION AS NEEDED
Status: DISCONTINUED | OUTPATIENT
Start: 2022-12-01 | End: 2022-12-01 | Stop reason: HOSPADM

## 2022-12-01 RX ORDER — SODIUM CHLORIDE, SODIUM LACTATE, POTASSIUM CHLORIDE, CALCIUM CHLORIDE 600; 310; 30; 20 MG/100ML; MG/100ML; MG/100ML; MG/100ML
125 INJECTION, SOLUTION INTRAVENOUS CONTINUOUS
Status: DISCONTINUED | OUTPATIENT
Start: 2022-12-01 | End: 2022-12-03

## 2022-12-01 RX ORDER — MORPHINE SULFATE 0.5 MG/ML
INJECTION, SOLUTION EPIDURAL; INTRATHECAL; INTRAVENOUS AS NEEDED
Status: DISCONTINUED | OUTPATIENT
Start: 2022-12-01 | End: 2022-12-01 | Stop reason: HOSPADM

## 2022-12-01 RX ORDER — EPHEDRINE SULFATE/0.9% NACL/PF 50 MG/5 ML
SYRINGE (ML) INTRAVENOUS AS NEEDED
Status: DISCONTINUED | OUTPATIENT
Start: 2022-12-01 | End: 2022-12-01 | Stop reason: HOSPADM

## 2022-12-01 RX ORDER — FENTANYL CITRATE 50 UG/ML
INJECTION, SOLUTION INTRAMUSCULAR; INTRAVENOUS
Status: SHIPPED | OUTPATIENT
Start: 2022-12-01 | End: 2022-12-01

## 2022-12-01 RX ORDER — PHENYLEPHRINE HCL IN 0.9% NACL 0.4MG/10ML
SYRINGE (ML) INTRAVENOUS AS NEEDED
Status: DISCONTINUED | OUTPATIENT
Start: 2022-12-01 | End: 2022-12-01 | Stop reason: HOSPADM

## 2022-12-01 RX ORDER — ONDANSETRON 2 MG/ML
4 INJECTION INTRAMUSCULAR; INTRAVENOUS
Status: DISCONTINUED | OUTPATIENT
Start: 2022-12-01 | End: 2022-12-03

## 2022-12-01 RX ORDER — KETOROLAC TROMETHAMINE 30 MG/ML
INJECTION, SOLUTION INTRAMUSCULAR; INTRAVENOUS AS NEEDED
Status: DISCONTINUED | OUTPATIENT
Start: 2022-12-01 | End: 2022-12-01 | Stop reason: HOSPADM

## 2022-12-01 RX ORDER — SIMETHICONE 80 MG
80 TABLET,CHEWABLE ORAL AS NEEDED
Status: DISCONTINUED | OUTPATIENT
Start: 2022-12-01 | End: 2022-12-03 | Stop reason: HOSPADM

## 2022-12-01 RX ORDER — NALOXONE HYDROCHLORIDE 0.4 MG/ML
0.4 INJECTION, SOLUTION INTRAMUSCULAR; INTRAVENOUS; SUBCUTANEOUS AS NEEDED
Status: DISCONTINUED | OUTPATIENT
Start: 2022-12-01 | End: 2022-12-03

## 2022-12-01 RX ORDER — ONDANSETRON 2 MG/ML
4 INJECTION INTRAMUSCULAR; INTRAVENOUS
Status: DISCONTINUED | OUTPATIENT
Start: 2022-12-01 | End: 2022-12-01 | Stop reason: SDUPTHER

## 2022-12-01 RX ORDER — FAMOTIDINE 10 MG/ML
INJECTION INTRAVENOUS AS NEEDED
Status: DISCONTINUED | OUTPATIENT
Start: 2022-12-01 | End: 2022-12-01 | Stop reason: HOSPADM

## 2022-12-01 RX ORDER — ACETAMINOPHEN 325 MG/1
650 TABLET ORAL
Status: DISCONTINUED | OUTPATIENT
Start: 2022-12-01 | End: 2022-12-03 | Stop reason: HOSPADM

## 2022-12-01 RX ORDER — DEXAMETHASONE SODIUM PHOSPHATE 4 MG/ML
INJECTION, SOLUTION INTRA-ARTICULAR; INTRALESIONAL; INTRAMUSCULAR; INTRAVENOUS; SOFT TISSUE AS NEEDED
Status: DISCONTINUED | OUTPATIENT
Start: 2022-12-01 | End: 2022-12-01 | Stop reason: HOSPADM

## 2022-12-01 RX ORDER — FENTANYL CITRATE 50 UG/ML
INJECTION, SOLUTION INTRAMUSCULAR; INTRAVENOUS AS NEEDED
Status: DISCONTINUED | OUTPATIENT
Start: 2022-12-01 | End: 2022-12-01 | Stop reason: HOSPADM

## 2022-12-01 RX ORDER — MISOPROSTOL 200 UG/1
800 TABLET ORAL ONCE
Status: ACTIVE | OUTPATIENT
Start: 2022-12-01 | End: 2022-12-01

## 2022-12-01 RX ORDER — OXYTOCIN 10 [USP'U]/ML
INJECTION, SOLUTION INTRAMUSCULAR; INTRAVENOUS AS NEEDED
Status: DISCONTINUED | OUTPATIENT
Start: 2022-12-01 | End: 2022-12-01 | Stop reason: HOSPADM

## 2022-12-01 RX ORDER — IBUPROFEN 800 MG/1
800 TABLET ORAL
Status: DISCONTINUED | OUTPATIENT
Start: 2022-12-01 | End: 2022-12-03 | Stop reason: HOSPADM

## 2022-12-01 RX ORDER — SODIUM CHLORIDE 0.9 % (FLUSH) 0.9 %
5-40 SYRINGE (ML) INJECTION AS NEEDED
Status: DISCONTINUED | OUTPATIENT
Start: 2022-12-01 | End: 2022-12-03

## 2022-12-01 RX ORDER — SODIUM CHLORIDE 0.9 % (FLUSH) 0.9 %
5-40 SYRINGE (ML) INJECTION EVERY 8 HOURS
Status: DISCONTINUED | OUTPATIENT
Start: 2022-12-01 | End: 2022-12-01 | Stop reason: HOSPADM

## 2022-12-01 RX ADMIN — OXYTOCIN 30 UNITS: 10 INJECTION, SOLUTION INTRAMUSCULAR; INTRAVENOUS at 10:08

## 2022-12-01 RX ADMIN — Medication 25 MG: at 09:45

## 2022-12-01 RX ADMIN — SODIUM CHLORIDE, POTASSIUM CHLORIDE, SODIUM LACTATE AND CALCIUM CHLORIDE: 600; 310; 30; 20 INJECTION, SOLUTION INTRAVENOUS at 10:03

## 2022-12-01 RX ADMIN — SODIUM CHLORIDE, POTASSIUM CHLORIDE, SODIUM LACTATE AND CALCIUM CHLORIDE 1000 ML: 600; 310; 30; 20 INJECTION, SOLUTION INTRAVENOUS at 08:40

## 2022-12-01 RX ADMIN — SODIUM CHLORIDE, POTASSIUM CHLORIDE, SODIUM LACTATE AND CALCIUM CHLORIDE 125 ML/HR: 600; 310; 30; 20 INJECTION, SOLUTION INTRAVENOUS at 13:55

## 2022-12-01 RX ADMIN — BUPIVACAINE HYDROCHLORIDE 1.4 ML: 7.5 INJECTION, SOLUTION EPIDURAL; RETROBULBAR at 09:42

## 2022-12-01 RX ADMIN — FAMOTIDINE 20 MG: 10 INJECTION INTRAVENOUS at 09:36

## 2022-12-01 RX ADMIN — FENTANYL CITRATE 50 MCG: 50 INJECTION, SOLUTION INTRAMUSCULAR; INTRAVENOUS at 09:52

## 2022-12-01 RX ADMIN — CEFAZOLIN 3 G: 10 INJECTION, POWDER, FOR SOLUTION INTRAVENOUS at 09:46

## 2022-12-01 RX ADMIN — ONDANSETRON HYDROCHLORIDE 4 MG: 2 SOLUTION INTRAMUSCULAR; INTRAVENOUS at 09:35

## 2022-12-01 RX ADMIN — DEXAMETHASONE SODIUM PHOSPHATE 4 MG: 4 INJECTION, SOLUTION INTRAMUSCULAR; INTRAVENOUS at 10:12

## 2022-12-01 RX ADMIN — SODIUM CHLORIDE, POTASSIUM CHLORIDE, SODIUM LACTATE AND CALCIUM CHLORIDE 1000 ML: 600; 310; 30; 20 INJECTION, SOLUTION INTRAVENOUS at 08:12

## 2022-12-01 RX ADMIN — FENTANYL CITRATE 10 MCG: 50 INJECTION, SOLUTION INTRAMUSCULAR; INTRAVENOUS at 09:42

## 2022-12-01 RX ADMIN — SODIUM CHLORIDE, POTASSIUM CHLORIDE, SODIUM LACTATE AND CALCIUM CHLORIDE 125 ML/HR: 600; 310; 30; 20 INJECTION, SOLUTION INTRAVENOUS at 22:57

## 2022-12-01 RX ADMIN — MORPHINE SULFATE 200 MCG: 0.5 INJECTION, SOLUTION EPIDURAL; INTRATHECAL; INTRAVENOUS at 09:42

## 2022-12-01 RX ADMIN — KETOROLAC TROMETHAMINE 30 MG: 30 INJECTION, SOLUTION INTRAMUSCULAR; INTRAVENOUS at 19:54

## 2022-12-01 RX ADMIN — ONDANSETRON 4 MG: 2 INJECTION INTRAMUSCULAR; INTRAVENOUS at 14:38

## 2022-12-01 RX ADMIN — KETOROLAC TROMETHAMINE 30 MG: 30 INJECTION, SOLUTION INTRAMUSCULAR; INTRAVENOUS at 10:31

## 2022-12-01 RX ADMIN — Medication 80 MCG: at 09:53

## 2022-12-01 RX ADMIN — ONDANSETRON 4 MG: 2 INJECTION INTRAMUSCULAR; INTRAVENOUS at 19:55

## 2022-12-01 NOTE — OP NOTES
Operative Note    Patient: Erna Chen  YOB: 1990  MRN: [id number]    Date of Procedure: 2022     Pre-Op Diagnosis: Previous surgery to uterus affecting current pregnancy [O34.29]  - hx of myomectomy    Post-Op Diagnosis: Same as preoperative diagnosis. Procedure(s):   SECTION    Surgeon(s):  Aaliyah Bowman MD    Surgical Assistant: Etta Álvarez    Anesthesia: Spinal     Estimated Blood Loss (mL):  538    Complications: None    Specimens: * No specimens in log *     Implants: * No implants in log *    Drains: * No LDAs found *    Findings: VMI in vertex presentation  - placenta w tightly aderent membranes in HECTOR, carefully removed in entirety  - inspection of placenta after delivery was normal, w small extra area of membranes, all appeared in tact    Detailed Description of Procedure:     Procedure Detail:    After proper patient identification and time out was performed,  spinal anesthesia was administered and found to be adequate. Salinas catheter had been placed using sterile technique. The patient was prepped and draped in the normal sterile fashion. The abdomen was entered using the Pfannenstiel technique. The peritoneum was entered well superior to the bladder without any apparent injury. The bladder flap was created without difficulty. A low transverse uterine incision was made with the scalpel and extended. Amniotomy was performed and the fluid was clear. The babys head was then delivered atraumatically. The nose and mouth were suctioned. The cord was clamped and cut and the baby was handed off to Nursing staff in attendance. Placenta was then removed from the uterus. The uterus was curettaged with a moist lap pad and cleared of all clots and debris. The membranes were densely adhered to HECTOR, and careful attention was paid to be sure all was removed. The uterine incision was closed with 0 Vicryl  in running locking fashion with good hemostasis assured.  A second imbricating layer was placed. Good hemostasis was again reassured and the uterus was returned to the abdomen. The pelvis was irrigated with warm normal saline and good hemostasis was again reassured throughout. The fascia was closed with 0 Vicryl in a running fashion. Good hemostasis was assured. The skin was closed with a insorb closure. The patient tolerated the procedure well. Sponge, lap, and needle counts were correct times three and the patient and baby were taken to recovery/postpartum room in stable condition.     Kanchan Waters MD  December 1, 2022  10:36 AM                       Electronically Signed by Kanchan Waters MD on 12/1/2022 at 10:35 AM

## 2022-12-01 NOTE — ANESTHESIA PROCEDURE NOTES
Spinal Block    Start time: 12/1/2022 9:38 AM  End time: 12/1/2022 9:52 AM  Performed by: Ivonne Kc CRNA  Authorized by: Prerna Gama MD     Pre-procedure: Indications: primary anesthetic  Preanesthetic Checklist: patient identified, risks and benefits discussed, anesthesia consent, site marked, patient being monitored, timeout performed and fire risk safety assessment completed and verbalized    Timeout Time: 09:41 EST      Spinal Block:   Patient Position:  Seated  Prep Region:  Lumbar  Prep: DuraPrep and patient draped      Location:  L2-3  Technique:  Single shot  Local: fentaNYL citrate (PF) Intrathecal - Intrathecal   50 mcg - 12/1/2022 9:52:00 AM    Med Admin Time: 12/1/2022 9:52 AM    Needle:   Needle Type:  Pencan  Needle Gauge:  25 G  Attempts:  2      Events: CSF confirmed, no blood with aspiration and no paresthesia        Assessment:  Insertion:  Uncomplicated  Patient tolerance:  Patient tolerated the procedure well with no immediate complications  First pass at L3-4 yielded deep OS; first pass at L2-3: easily placed spinal, neg heme, neg paresthesia, +CSF w ULYSSES. Pt tolerated v well.

## 2022-12-01 NOTE — LACTATION NOTE
This note was copied from a baby's chart. 1350-Mother resting following delivery. Mother having difficulty staying awake, family at bedside. Lc visit cut short as nurse are moving pt to MIU.    1440-Mother vomiting at this time. [facility] consult deferred at this time.

## 2022-12-01 NOTE — ROUTINE PROCESS
Received report from Madelaine Leal, Replaced by [facility].

## 2022-12-01 NOTE — H&P
[facility] [address], [place] [address]  [address] [place] [address]    1990    #[id number]  Encounter Summary - H&P  Date Printed:   2022         Encounter Date 2022     Chief Complaint None recorded     History of Present Illness   H&P for schedule CS due to hx myomectomy    see a/p     Past Medical History Discussed Past Medical History  Other: Y - Fibroids  Gastrointestinal Disease: Y - Gastritis-gall bladder removed     Social History Discussed Social History  Substance Use  Do you or have you ever smoked tobacco?: Never smoker  How much tobacco do you smoke?: None  Do you or have you ever used e-cigarettes or vape?: Never used electronic cigarettes  How many years have you smoked tobacco?: 0  Do you or have you ever used smokeless tobacco?: Never used smokeless tobacco  Which illicit or recreational drugs have you used?: denies  How much tobacco do you chew?: none  What was the date of your most recent tobacco screening?: 2020  What is your level of alcohol consumption?: None  What is your level of caffeine consumption?: Moderate  Marriage and Sexuality  Are you sexually active?: Yes  Do you use protection during sex?: Always  How many children do you have?: 0  OB/GYN Social History  Are you working: Yes  How often do you have a DRINK containing ALCOHOL?: 2-3 times a week (Notes: yes-social drinker/wine 2x weekly)  Other  Marital status: Single (Notes: in a relationship)     Surgical History Reviewed Surgical History  Cholecystectomy  Uterine myomectomy - 2021 - laparoscopic myomectomy with contained morcellation--[facility]le     Family History   Discussed Family History  Maternal Grandmother - Malignant tumor of breast  - Diagnosis age: 63's  Maternal Aunt - Malignant tumor of breast  - Diagnosis age: mid 42's  Father - Malignant neoplastic disease  - -unsure of type of cancer  Mother - Uterine leiomyoma  - hyst performed  Sister - Uterine leiomyoma  - older sister-hyst performed     Medications   Reviewed Medications  Prenatal  05/16/22   entered  RhoGAM Ultra-Filtered PLUS 1,500 unit (300 mcg) intramuscular syringe  Inject 320 mcg by intramuscular route. 09/29/22   administered     Allergies   Allergies not reviewed (last reviewed 11/10/2022)  NKDA     Vitals   None recorded     Review of Systems None recorded     Physical Exam   Constitutional: General Appearance: well developed and nourished and pleasant. Level of Distress: no acute distress. Ambulation: ambulating normally. Head: Head: normocephalic. Eyes: Extraocular Movements extraocular movements intact. Ears, Nose, Mouth, Throat: Ears normal hearing. Neck: Appearance trachea midline. Lungs / Chest: Respiratory effort: unlabored. Neurologic: Gait and Station: normal gait. Sensation: grossly intact. Motor: grossly intact. Mental Status Exam: Orientation oriented to person, place, and time. Assessment and Plan   1. Anxiety -  no meds  O99.343: Other mental disorders complicating pregnancy, third trimester    2. Depressive disorder  O99.343: Other mental disorders complicating pregnancy, third trimester    3. Asthma -  no meds  O99.513: Diseases of the respiratory system complicating pregnancy, third trimester    4. History of uterine myomectomy -  laparoscopic - Op note \"incision stretching from cornu to cornu\" with 12 fibroids removed  Will schedule CS at 37 weeks -- scheduled  2 intramural fibroids 4.7 and 1.2 cm    *pt scheduled for primary CS due to hx of myomectomy  - risks reviewed, consent signed, will proceed w surgery  O34.29: Maternal care due to uterine scar from other previous surgery    5. Body mass index 30+ - obesity -  pre-pregnancy BMI: 36.3  NIPT nml  AFP neg  early glucola nml  28 wk 155  3 hr nml  U71.706: Obesity complicating pregnancy, third trimester    6.  RhD negative -  eval after delivery  Z01.83: Encounter for blood typing    7. Rubella non-immune -  vaccination after delivery  Z78.9: Other specified health status    8.  Gestation period, 35 weeks  Z3A.35: 35 weeks gestation of pregnancy         Provider   Electronically Signed by: Luther Barrett MD

## 2022-12-01 NOTE — PROGRESS NOTES
0730- pt arrives on the unit for scheduled . 8479- Pt in OR see log for additional information     1040- pt leaving OR to go back to room. See log for additional information. 1300- pt resting quietly in bed    1335- MD Farhat Catalan called for patients request for pain medication. Following up with anesthesia for medication orders. 1415- Pt transported upstairs. TRANSFER - OUT REPORT:    Verbal report given to ENDY Killian RN (name) on JOSIAS  being transferred to MIU (unit) for routine progression of care       Report consisted of patients Situation, Background, Assessment and   Recommendations(SBAR). Information from the following report(s) SBAR, Kardex, OR Summary, Intake/Output, MAR, and Recent Results was reviewed with the receiving nurse. Lines:   Peripheral IV 22 Distal;Left;Posterior Wrist (Active)        Opportunity for questions and clarification was provided.       Patient transported with:   Registered Nurse

## 2022-12-01 NOTE — ANESTHESIA PREPROCEDURE EVALUATION
Relevant Problems   No relevant active problems       Anesthetic History   No history of anesthetic complications            Review of Systems / Medical History  Patient summary reviewed and pertinent labs reviewed    Pulmonary            Asthma : well controlled       Neuro/Psych   Within defined limits           Cardiovascular  Within defined limits                Exercise tolerance: >4 METS  Comments: Elevated BP   GI/Hepatic/Renal  Within defined limits              Endo/Other  Within defined limits           Other Findings              Physical Exam    Airway  Mallampati: II  TM Distance: > 6 cm  Neck ROM: normal range of motion   Mouth opening: Normal     Cardiovascular  Regular rate and rhythm,  S1 and S2 normal,  no murmur, click, rub, or gallop  Rhythm: regular  Rate: normal         Dental    Dentition: Caps/crowns     Pulmonary  Breath sounds clear to auscultation               Abdominal  GI exam deferred       Other Findings            Anesthetic Plan    ASA: 2  Anesthesia type: spinal - TAP block          Induction: Intravenous  Anesthetic plan and risks discussed with: Patient

## 2022-12-02 LAB
BASOPHILS # BLD: 0 K/UL (ref 0–0.1)
BASOPHILS NFR BLD: 0 % (ref 0–1)
DIFFERENTIAL METHOD BLD: ABNORMAL
EOSINOPHIL # BLD: 0.1 K/UL (ref 0–0.4)
EOSINOPHIL NFR BLD: 0 % (ref 0–7)
ERYTHROCYTE [DISTWIDTH] IN BLOOD BY AUTOMATED COUNT: 14.4 % (ref 11.5–14.5)
HCT VFR BLD AUTO: 25.6 % (ref 35–47)
HGB BLD-MCNC: 8.6 G/DL (ref 11.5–16)
IMM GRANULOCYTES # BLD AUTO: 0.1 K/UL (ref 0–0.04)
IMM GRANULOCYTES NFR BLD AUTO: 1 % (ref 0–0.5)
LYMPHOCYTES # BLD: 2 K/UL (ref 0.8–3.5)
LYMPHOCYTES NFR BLD: 12 % (ref 12–49)
MCH RBC QN AUTO: 27 PG (ref 26–34)
MCHC RBC AUTO-ENTMCNC: 33.6 G/DL (ref 30–36.5)
MCV RBC AUTO: 80.3 FL (ref 80–99)
MONOCYTES # BLD: 0.9 K/UL (ref 0–1)
MONOCYTES NFR BLD: 6 % (ref 5–13)
NEUTS SEG # BLD: 12.9 K/UL (ref 1.8–8)
NEUTS SEG NFR BLD: 81 % (ref 32–75)
NRBC # BLD: 0 K/UL (ref 0–0.01)
NRBC BLD-RTO: 0 PER 100 WBC
PLATELET # BLD AUTO: 283 K/UL (ref 150–400)
PMV BLD AUTO: 9.8 FL (ref 8.9–12.9)
RBC # BLD AUTO: 3.19 M/UL (ref 3.8–5.2)
WBC # BLD AUTO: 16 K/UL (ref 3.6–11)

## 2022-12-02 PROCEDURE — 74011250636 HC RX REV CODE- 250/636: Performed by: ANESTHESIOLOGY

## 2022-12-02 PROCEDURE — 65270000029 HC RM PRIVATE

## 2022-12-02 PROCEDURE — 85461 HEMOGLOBIN FETAL: CPT

## 2022-12-02 PROCEDURE — 74011000250 HC RX REV CODE- 250: Performed by: OBSTETRICS & GYNECOLOGY

## 2022-12-02 PROCEDURE — 86900 BLOOD TYPING SEROLOGIC ABO: CPT

## 2022-12-02 PROCEDURE — 85025 COMPLETE CBC W/AUTO DIFF WBC: CPT

## 2022-12-02 PROCEDURE — 36415 COLL VENOUS BLD VENIPUNCTURE: CPT

## 2022-12-02 PROCEDURE — 74011250637 HC RX REV CODE- 250/637: Performed by: OBSTETRICS & GYNECOLOGY

## 2022-12-02 PROCEDURE — 74011250636 HC RX REV CODE- 250/636: Performed by: OBSTETRICS & GYNECOLOGY

## 2022-12-02 RX ADMIN — IBUPROFEN 800 MG: 800 TABLET, FILM COATED ORAL at 15:44

## 2022-12-02 RX ADMIN — SIMETHICONE 80 MG: 80 TABLET, CHEWABLE ORAL at 02:03

## 2022-12-02 RX ADMIN — KETOROLAC TROMETHAMINE 30 MG: 30 INJECTION, SOLUTION INTRAMUSCULAR; INTRAVENOUS at 02:03

## 2022-12-02 RX ADMIN — IBUPROFEN 800 MG: 800 TABLET, FILM COATED ORAL at 07:51

## 2022-12-02 RX ADMIN — IBUPROFEN 800 MG: 800 TABLET, FILM COATED ORAL at 22:45

## 2022-12-02 RX ADMIN — Medication 1 TABLET: at 07:51

## 2022-12-02 RX ADMIN — HUMAN RHO(D) IMMUNE GLOBULIN 0.3 MG: 300 INJECTION, SOLUTION INTRAMUSCULAR at 17:57

## 2022-12-02 RX ADMIN — SODIUM CHLORIDE, PRESERVATIVE FREE 10 ML: 5 INJECTION INTRAVENOUS at 02:03

## 2022-12-02 NOTE — PROGRESS NOTES
Post-Operative  Day 1    [facility]     Assessment: Post-Op day 1 s/p 1LTCS for hy myomectomy, stable    Plan:     - Routine post-operative care. - Asthma- well controlled  - Depression- EPDS score pending  - Rh negative, rhogam prior to discharge  - 1 elevated BP -CMP and CBC wnl, monitor  - Postop hemoglobin stable. Plan to start Fe at discharge if pt anemic.  - Ambulate today. - reviewed r/b/a to circumcision. Consents to circumcision      Information for the patient's :  Sandra Olivia, Male Judy Bal [[id number]]     Patient doing well without significant complaint. Tolerating diet. Salinas out. Ambulating. Vitals:  Visit Vitals  /69 (BP 1 Location: Left upper arm, BP Patient Position: At rest)   Pulse 100   Temp 98.7 °F (37.1 °C)   Resp 16   Ht 5' 1\" (1.549 m)   Wt 89.4 kg (197 lb)   SpO2 98%   Breastfeeding Unknown   BMI 37.22 kg/m²     Temp (24hrs), Av.5 °F (36.9 °C), Min:97.9 °F (36.6 °C), Max:98.9 °F (37.2 °C)      Last 24hr Input/Output:    Intake/Output Summary (Last 24 hours) at 2022 1058  Last data filed at 2022 0600  Gross per 24 hour   Intake --   Output 1500 ml   Net -1500 ml          Exam:     Patient without distress. Fundus firm, nontender  Incision bandaged, clean, dry, intact. Perineum with normal lochia noted per nursing assessment. Lower extremities are negative for pathological edema.     Labs:   Lab Results   Component Value Date/Time    WBC 16.0 (H) 2022 02:18 AM    WBC 11.4 (H) 2022 07:58 AM    WBC 8.5 07/15/2013 08:09 PM    WBC 7.1 2011 09:25 PM    HGB 8.6 (L) 2022 02:18 AM    HGB 12.5 2022 07:58 AM    HGB 13.2 07/15/2013 08:09 PM    HGB 13.1 2011 09:25 PM    HCT 25.6 (L) 2022 02:18 AM    HCT 38.3 2022 07:58 AM    HCT 39.2 07/15/2013 08:09 PM    HCT 39.7 2011 09:25 PM    PLATELET 912  02:18 AM    PLATELET 057  07:58 AM    PLATELET 625 07/15/2013 08:09 PM    PLATELET 507 29/61/8924 09:25 PM       Recent Results (from the past 24 hour(s))   CBC WITH AUTOMATED DIFF    Collection Time: 12/02/22  2:18 AM   Result Value Ref Range    WBC 16.0 (H) 3.6 - 11.0 K/uL    RBC 3.19 (L) 3.80 - 5.20 M/uL    HGB 8.6 (L) 11.5 - 16.0 g/dL    HCT 25.6 (L) 35.0 - 47.0 %    MCV 80.3 80.0 - 99.0 FL    MCH 27.0 26.0 - 34.0 PG    MCHC 33.6 30.0 - 36.5 g/dL    RDW 14.4 11.5 - 14.5 %    PLATELET 639 808 - 772 K/uL    MPV 9.8 8.9 - 12.9 FL    NRBC 0.0 0  WBC    ABSOLUTE NRBC 0.00 0.00 - 0.01 K/uL    NEUTROPHILS 81 (H) 32 - 75 %    LYMPHOCYTES 12 12 - 49 %    MONOCYTES 6 5 - 13 %    EOSINOPHILS 0 0 - 7 %    BASOPHILS 0 0 - 1 %    IMMATURE GRANULOCYTES 1 (H) 0.0 - 0.5 %    ABS. NEUTROPHILS 12.9 (H) 1.8 - 8.0 K/UL    ABS. LYMPHOCYTES 2.0 0.8 - 3.5 K/UL    ABS. MONOCYTES 0.9 0.0 - 1.0 K/UL    ABS. EOSINOPHILS 0.1 0.0 - 0.4 K/UL    ABS. BASOPHILS 0.0 0.0 - 0.1 K/UL    ABS. IMM.  GRANS. 0.1 (H) 0.00 - 0.04 K/UL    DF AUTOMATED     RH IMMUNE GLOBULIN EVAL-LAB ORDER    Collection Time: 12/02/22  2:18 AM   Result Value Ref Range    ABO/Rh(D) B NEGATIVE     Fetal screen NEG     Unit number [id number]     Blood component type RH IMMUNE GLOBULIN     Unit division 00     Status of unit ALLOCATED

## 2022-12-02 NOTE — LACTATION NOTE
This note was copied from a baby's chart. Pt sleeping, LC to round again. [facility] asked visitor to call if/when ready to breastfeed.

## 2022-12-02 NOTE — ROUTINE PROCESS
Bedside and Verbal shift change report given to TISHA Ramirez (oncoming nurse) by iTm Musa RN (offgoing nurse). Report included the following information SBAR, Kardex, Intake/Output, and MAR.

## 2022-12-02 NOTE — ROUTINE PROCESS
Bedside and verbal shift change report given to oncoming nurse, as assigned, by offgoing nurse, María Patel RN. Report included SBAR, Kardex, I&Os, Recent Results, Procedures, MAR, and changes in patient status. Oncoming nurse and patient given opportunity for questions.

## 2022-12-03 VITALS
WEIGHT: 197 LBS | RESPIRATION RATE: 16 BRPM | TEMPERATURE: 98.4 F | HEART RATE: 92 BPM | BODY MASS INDEX: 37.19 KG/M2 | DIASTOLIC BLOOD PRESSURE: 57 MMHG | SYSTOLIC BLOOD PRESSURE: 96 MMHG | HEIGHT: 61 IN | OXYGEN SATURATION: 99 %

## 2022-12-03 LAB
ABO + RH BLD: NORMAL
BLD PROD TYP BPU: NORMAL
BPU ID: NORMAL
FETAL SCREEN,FMHS: NORMAL
STATUS OF UNIT,%ST: NORMAL
UNIT DIVISION, %UDIV: 0

## 2022-12-03 PROCEDURE — 74011250636 HC RX REV CODE- 250/636: Performed by: OBSTETRICS & GYNECOLOGY

## 2022-12-03 PROCEDURE — 74011250637 HC RX REV CODE- 250/637: Performed by: OBSTETRICS & GYNECOLOGY

## 2022-12-03 PROCEDURE — 90707 MMR VACCINE SC: CPT | Performed by: OBSTETRICS & GYNECOLOGY

## 2022-12-03 RX ORDER — OXYCODONE HYDROCHLORIDE 5 MG/1
5 TABLET ORAL
Qty: 12 TABLET | Refills: 0 | Status: SHIPPED | OUTPATIENT
Start: 2022-12-03 | End: 2022-12-06

## 2022-12-03 RX ORDER — IBUPROFEN 800 MG/1
800 TABLET ORAL
Qty: 40 TABLET | Refills: 1 | Status: SHIPPED | OUTPATIENT
Start: 2022-12-03

## 2022-12-03 RX ADMIN — IBUPROFEN 800 MG: 800 TABLET, FILM COATED ORAL at 06:57

## 2022-12-03 RX ADMIN — Medication 1 TABLET: at 11:40

## 2022-12-03 RX ADMIN — IBUPROFEN 800 MG: 800 TABLET, FILM COATED ORAL at 15:04

## 2022-12-03 RX ADMIN — SIMETHICONE 80 MG: 80 TABLET, CHEWABLE ORAL at 11:40

## 2022-12-03 RX ADMIN — MEASLES, MUMPS, AND RUBELLA VIRUS VACCINE LIVE 0.5 ML: 1000; 12500; 1000 INJECTION, POWDER, LYOPHILIZED, FOR SUSPENSION SUBCUTANEOUS at 11:40

## 2022-12-03 NOTE — DISCHARGE SUMMARY
Obstetrical Discharge Summary     Name: Artis Mcbride MRN: [id number]  SSN: [id number]    YOB: 1990  Age: 28 y.o. Sex: female      Admit Date: 2022    Discharge Date: 12/3/2022     Admitting Physician: Param Collado MD     Attending Physician:  Jean Degroot MD     Admission Diagnoses: Previous surgery to uterus affecting current pregnancy [O34.29]  Term pregnancy [Z34.90]   delivery due to maternal disorder [O99.892]    Discharge Diagnoses:   Information for the patient's :  Kirby Adler [[id number]]   Delivery of a 2.94 kg male infant via  on 2022 at 10:06 AM  by Param Collado. Apgars were 9  and 9 . Additional Diagnoses:   Hospital Problems  Never Reviewed            Codes Class Noted POA    Term pregnancy ICD-10-CM: Z34.90  ICD-9-CM: V22.1  2022 Unknown         delivery due to maternal disorder ICD-10-CM: O99.892  ICD-9-CM: Aby Machado  2022 Unknown          Lab Results   Component Value Date/Time    Rubella, External non-immune 2022 12:00 AM    GrBStrep, External Positive 2022 12:00 AM       Hospital Course: Normal hospital course following the delivery. Patient Instructions:   Current Discharge Medication List        START taking these medications    Details   oxyCODONE IR (Roxicodone) 5 mg immediate release tablet Take 1 Tablet by mouth every six (6) hours as needed for Pain for up to 3 days. Max Daily Amount: 20 mg.  Qty: 12 Tablet, Refills: 0    Associated Diagnoses:  delivery due to maternal disorder           CONTINUE these medications which have CHANGED    Details   ibuprofen (MOTRIN) 800 mg tablet Take 1 Tablet by mouth every eight (8) hours as needed for Pain. Qty: 40 Tablet, Refills: 1           CONTINUE these medications which have NOT CHANGED    Details   PNV Comb #2-Iron-FA-Omega 3 29-1-400 mg cmpk Take  by mouth.            STOP taking these medications       doxylamine succinate (UNISOM) 25 mg tablet Comments:   Reason for Stopping:               Disposition at Discharge: Home or self care    Condition at Discharge: Stable    Reference my discharge instructions. Follow-up Appointments   Procedures    FOLLOW UP VISIT Appointment in: One Week Follow up in 1 week for check at [address]     Follow up in 1 week for check at [address]     Standing Status:   Standing     Number of Occurrences:   1     Order Specific Question:   Appointment in     Answer:    One Week        Signed By:  Mally Gaona MD     December 3, 2022

## 2022-12-03 NOTE — LACTATION NOTE
This note was copied from a baby's chart. Mother states that she is still attempting to breast feed but using the shield is helpful. She is mainly bottle feeding and pumping after. LC assisted and taught mother how to use her pump correctly. LC also reviewed the importance of paced bottle feeding. Offering the breast first and then supplementing encouraged. Warm line and group info provided. Infant in the nursery for circumcision at this time, pt  states that she will call for further lactation assistance if needed. Pumping:  Guidelines for pumping, milk collection and storage, proper cleaning of pump parts all reviewed. How to establish and maintain breast milk supply through pumping reviewed. Differences between hospital grade rental pumps vs store bought double electric/hand pumps discussed. Set up pumping with double electric set up. Assisted with pump session. List of area pump rental locations and lactation support services provided. Pt chooses to do both breast and bottle. Discussed effects of early supplementation on breastfeeding success; may decrease breastmilk production and supply, increase risk for pathological engorgement, baby may develop preference for faster flow from bottles vs breast, and baby's stomach can be stretched if larger volumes of formula are given. Pt will successfully establish breastfeeding by feeding in response to early feeding cues   or wake every 3h, will obtain deep latch, and will keep log of feedings/output. Taught to BF at hunger cues and or q 2-3 hrs and to offer 10-20 drops of hand expressed colostrum at any non-feeds.       Breast Assessment  Left Breast: Medium  Left Nipple: Everted, Intact  Right Breast: Medium  Right Nipple: Everted, Intact  Breast- Feeding Assessment  Attends Breast-Feeding Classes: No  Breast-Feeding Experience: No  Breast Trauma/Surgery: No  Type/Quality: [address] [facility]  Lactation Consultant Visits  Breast-Feedings: Fair (assisted mother with pumping)  Mother/Infant Observation  Mother Observation: Breast comfortable  LATCH Documentation  Latch: Repeated attempts, hold nipple in mouth, stimulate to suck  Audible Swallowing: None  Type of Nipple: Everted (after stimulation)  Comfort (Breast/Nipple): Soft/non-tender  Hold (Positioning): Full assist, teach one side, mother does other, staff holds  [facility] Score: 6    Chart shows numerous feedings, void, stool WNL. Discussed importance of monitoring outputs and feedings on first week of life. Discussed ways to tell if baby is  getting enough breast milk, ie  voids and stools, change in color of stool, and return to birth wt within 2 weeks. Follow up with pediatrician visit for weight check in 1-2 days (per AAP guidelines.)  Encouraged to call Warm Line  [phone]  for any questions/problems that arise.  Mother also given breastfeeding support group dates and times for any future needs

## 2022-12-03 NOTE — ROUTINE PROCESS
Bedside and Verbal shift change report given to [facility], RN (oncoming nurse) by Tim Musa RN (offgoing nurse). Report included the following information SBAR, Kardex, Intake/Output, and MAR.

## 2022-12-03 NOTE — ROUTINE PROCESS
Patient off unit in stable condition via wheelchair with volunteers for discharge home per  MD.  Patient is to follow up in 1 week for incision check and again in 6 weeks and is aware. Prescriptions called to patients pharmacy. Patient denies H/A, dizziness, nausea and or vomiting or pain at this time. Infant in car seat with mom.

## 2022-12-03 NOTE — PROGRESS NOTES
Post-Operative  Day 2    Pregonne Preeti     Assessment: Post-Op day 2 s/p scheduled LTCS after uterine myomectomy surgery, doing well    Plan:   - Routine post-operative care. - Asthma - stable  - Depression- 1 week mood check  - MMR postpartum  - Rhogam postpartum  - 1 elevated bp upon admit, none since then  - Circ done today after consent  - Plan for discharge [address] Discharge Date: Today. Information for the patient's :  Natalya Funes, Male Stephanie Riddles [[id number]]     Patient doing well without significant complaint. Tolerating regular diet. Ambulating. Voiding without difficulty. Vitals:  Visit Vitals  BP (!) 96/57 (BP Patient Position: At rest)   Pulse 92   Temp 98.4 °F (36.9 °C)   Resp 16   Ht 5' 1\" (1.549 m)   Wt 89.4 kg (197 lb)   SpO2 99%   Breastfeeding Unknown   BMI 37.22 kg/m²     Temp (24hrs), Av.3 °F (36.8 °C), Min:98.2 °F (36.8 °C), Max:98.4 °F (36.9 °C)        Exam:       Patient without distress. Fundus firm, nontender per nursing fundal checks. Incision bandaged. Clean, dry, intact. Perineum with normal lochia noted per nursing assessment. Lower extremities are negative for pathological edema. Labs:   Lab Results   Component Value Date/Time    WBC 16.0 (H) 2022 02:18 AM    WBC 11.4 (H) 2022 07:58 AM    WBC 8.5 07/15/2013 08:09 PM    WBC 7.1 2011 09:25 PM    HGB 8.6 (L) 2022 02:18 AM    HGB 12.5 2022 07:58 AM    HGB 13.2 07/15/2013 08:09 PM    HGB 13.1 2011 09:25 PM    HCT 25.6 (L) 2022 02:18 AM    HCT 38.3 2022 07:58 AM    HCT 39.2 07/15/2013 08:09 PM    HCT 39.7 2011 09:25 PM    PLATELET 869  02:18 AM    PLATELET 127 [id number] 07:58 AM    PLATELET 556  08:09 PM    PLATELET 705  09:25 PM       No results found for this or any previous visit (from the past 24 hour(s)).

## 2022-12-03 NOTE — DISCHARGE INSTRUCTIONS
POSTPARTUM DISCHARGE INSTRUCTIONS       Name:  Andi Ball  YOB: 1990  Admission Diagnosis:  Previous surgery to uterus affecting current pregnancy [O34.29]  Term pregnancy [Z34.90]   delivery due to maternal disorder [O99.892]           Delivery Type:   Section: What to Expect at Home    Your Recovery:  A  section, or , is surgery to deliver your baby through a cut, called an incision that the doctor makes in your lower belly and uterus. You may have some pain in your lower belly and need pain medicine for 1 to 2 weeks. You can expect some vaginal bleeding for several weeks. You will probably need about 6 weeks to fully recover. It is important to take it easy while the incision is healing. Avoid heavy lifting, strenuous activities, or exercises that strain the belly muscles while you are recovering. Ask a family member or friend for help with housework, cooking, and shopping. This care sheet gives you a general idea about how long it will take for you to recover. But each person recovers at a different pace. Follow the steps below to get better as quickly as possible. How can you care for yourself at home? Activity  Rest when you feel tired. Getting enough sleep will help you recover. Try to walk each day. Start by walking a little more than you did the day before. Bit by bit, increase the amount you walk. Walking boosts blood flow and helps prevent pneumonia, constipation, and blood clots. Avoid strenuous activities, such as bicycle riding, jogging, weightlifting, and aerobic exercise, for 6 weeks or until your doctor says it is okay. Until your doctor says it is okay, do not lift anything heavier than your baby. Do not do sit-ups or other exercise that strain the belly muscles for 6 weeks or until your doctor says it is okay. Hold a pillow over your incision when you cough or take deep breaths.  This will support your belly and decrease your pain.  You may shower as usual. Pat the incision dry when you are done. You will have some vaginal bleeding. Wear sanitary pads. Do not douche or use tampons until your doctor says it is okay. Ask your doctor when you can drive again. You will probably need to take at least 6 weeks off work. It depends on the type of work you do and how you feel. Wait until you are healed (about 4 to 6 weeks) before you have sexual intercourse. Your doctor will tell you when it is okay to have sex. Talk to your doctor about birth control. You can get pregnant even before your period returns. Also, you can get pregnant while you are breast-feeding. Incision care  Your skin is your body's first line of defense against germs, but an incision site leaves an easy way for germs to enter your body. To prevent infection:  Clean your hands frequently and before and after changing any touching any dressings. If you have strips of tape on the incision, leave the tape on for a week or until it falls off. Look at your incision closely every day for any changes. Contact your doctor if you experience any signs of infection, such as fever or increased redness at the surgical site. Wash the area daily with warm, soapy water, and pat it dry. Don't use hydrogen peroxide or alcohol, which can slow healing. You may cover the area with a gauze bandage if it weeps or rubs against clothing. Change the bandage every day. Keep the area clean and dry. Diet  You can eat your normal diet. If your stomach is upset, try bland, low-fat foods like plain rice, broiled chicken, toast, and yogurt. Drink plenty of fluids (unless your doctor tells you not to). You may notice that your bowel movements are not regular right after your surgery. This is common. Try to avoid constipation and straining with bowel movements. You may want to take a fiber supplement every day.  If you have not had a bowel movement after a couple of days, ask your doctor about taking a mild laxative. If you are breast-feeding, do not drink any alcohol. Medicines  Take pain medicines exactly as directed. If the doctor gave you a prescription medicine for pain, take it as prescribed. If you are not taking a prescription pain medicine, ask your doctor if you can take an over-the-counter medicine such as acetaminophen (Tylenol), ibuprofen (Advil, Motrin), or naproxen (Aleve), for cramps. Read and follow all instructions on the label. Do not take aspirin, because it can cause more bleeding. Do not take acetaminophen (Tylenol) and other acetaminophen containing medications (i.e. Percocet) at the same time. If you think your pain medicine is making you sick to your stomach: Take your medicine after meals (unless your doctor has told you not to). Ask your doctor for a different pain medicine. If your doctor prescribed antibiotics, take them as directed. Do not stop taking them just because you feel better. You need to take the full course of antibiotics. Mental Health  Many women get the \"baby blues\" during the first few days after childbirth. You may lose sleep, feel irritable, and cry easily. You may feel happy one minute and sad the next. Hormone changes are one cause of these emotional changes. Also, the demands of a new baby, along with visits from relatives or other family needs, add to a mother's stress. The \"baby blues\" often peak around the fourth day. Then they ease up in less than 2 weeks. If your moodiness or anxiety lasts for more than 2 weeks, or if you feel like life is not worth living, you may have postpartum depression. This is different for each mother. Some mothers with serious depression may worry intensely about their infant's well-being. Others may feel distant from their child. Some mothers might even feel that they might harm their baby. A mother may have signs of paranoia, wondering if someone is watching her.       With all the changes in your life, you may not know if you are depressed. Pregnancy sometimes causes changes in how you feel that are similar to the symptoms of depression. Symptoms of depression include:  Feeling sad or hopeless and losing interest in daily activities. These are the most common symptoms of depression. Sleeping too much or not enough. Feeling tired. You may feel as if you have no energy. Eating too much or too little. POSTPARTUM [employer] ([employer]I) offers a Warm line; Chat with the Expert phone sessions; Information and Articles about Pregnancy and Postpartum Mood Disorders; Comprehensive List of Free Support Groups; Knowledgeable local coordinators who will offer support, information, and resources; Guide to Resources on [employer]; Calendar of events in the  mood disorders community; Latest News and Research; and [facility] & [facility] [address] for [employer]. Remember - You are not alone; You are not to blame; With help, you will be well. 8-705-529-PPD(4773). WWW. POSTPARTUM. NET   Writing or talking about death, such as writing suicide notes or talking about guns, knives, or pills. Keep the numbers for these national suicide hotlines: 0-236-780-TALK (6-377.530.1433) and 3-461-OBVAPIT (5-364.244.3075). If you or someone you know talks about suicide or feeling hopeless, get help right away. Other instructions  If you breast-feed your baby, you may be more comfortable while you are healing if you place the baby so that he or she is not resting on your belly. Try tucking your baby under your arm, with his or her body along the side you will be feeding on. Support your baby's upper body with your arm. With that hand you can control your baby's head to bring his or her mouth to your breast. This is sometimes called the football hold. Follow-up care is a key part of your treatment and safety. Be sure to make and go to all appointments, and call your doctor if you are having problems.  It's also a good idea to know your test results and keep a list of the medicines you take. When should you call for help? Call 911 anytime you think you may need emergency care. For example, call if:  You are thinking of hurting yourself, your baby, or anyone else. You passed out (lost consciousness). You have symptoms of a blood clot in your lung (called a pulmonary embolism). These may include:  Sudden chest pain. Trouble breathing. Coughing up blood. Call your doctor now or seek immediate medical care if:    You have severe vaginal bleeding. You are soaking through a pad each hour for 2 or more hours. Your vaginal bleeding seems to be getting heavier or is still bright red 4 days after delivery. You are dizzy or lightheaded, or you feel like you may faint. You are vomiting or cannot keep fluids down. You have a fever. You have new or more belly pain. You have loose stitches, or your incision comes open. You have symptoms of infection, such as: Increased pain, swelling, warmth, or redness. Red streaks leading from the incision. Pus draining from the incision. A fever  You pass tissue (not just blood). Your vaginal discharge smells bad. Your belly feels tender or full and hard. Your breasts are continuously painful or red. You feel sad, anxious, or hopeless for more than a few days. You have sudden, severe pain in your belly. You have symptoms of a blood clot in your leg (called a deep vein thrombosis), such as:  Pain in your calf, back of the knee, thigh, or groin. Redness and swelling in your leg or groin. You have symptoms of preeclampsia, such as:  Sudden swelling of your face, hands, or feet. New vision problems (such as dimness or blurring). A severe headache. Your blood pressure is higher than it should be or rises suddenly. You have new nausea or vomiting. Watch closely for changes in your health, and be sure to contact your doctor if you have any problems.      Additional Information:  Not applicable    These are general instructions for a healthy lifestyle:    No smoking/ No tobacco products/ Avoid exposure to second hand smoke    Surgeon General's Warning:  Quitting smoking now greatly reduces serious risk to your health. Obesity, smoking, and sedentary lifestyle greatly increases your risk for illness    A healthy diet, regular physical exercise & weight monitoring are important for maintaining a healthy lifestyle    Recognize signs and symptoms of STROKE:    F-face looks uneven    A-arms unable to move or move unevenly    S-speech slurred or non-existent    T-time-call 911 as soon as signs and symptoms begin - DO NOT go       back to bed or wait to see if you get better - TIME IS BRAIN. I have had the opportunity to make my options or choices for discharge. I have received and understand these instructions.

## 2022-12-04 LAB
ABO + RH BLD: NORMAL
BLD PROD TYP BPU: NORMAL
BLD PROD TYP BPU: NORMAL
BLOOD GROUP ANTIBODIES SERPL: NORMAL
BLOOD GROUP ANTIBODIES SERPL: NORMAL
BPU ID: NORMAL
BPU ID: NORMAL
CROSSMATCH RESULT,%XM: NORMAL
CROSSMATCH RESULT,%XM: NORMAL
SPECIMEN EXP DATE BLD: NORMAL
STATUS OF UNIT,%ST: NORMAL
STATUS OF UNIT,%ST: NORMAL
UNIT DIVISION, %UDIV: 0
UNIT DIVISION, %UDIV: 0

## 2022-12-07 NOTE — ANESTHESIA POSTPROCEDURE EVALUATION
Procedure(s):   SECTION.     spinal    Anesthesia Post Evaluation      Multimodal analgesia: multimodal analgesia used between 6 hours prior to anesthesia start to PACU discharge  Patient location during evaluation: bedside  Patient participation: complete - patient participated  Level of consciousness: awake  Pain management: adequate  Airway patency: patent  Anesthetic complications: no  Cardiovascular status: acceptable  Respiratory status: acceptable  Hydration status: acceptable        INITIAL Post-op Vital signs:   Vitals Value Taken Time   /62 22 1239   Temp 36.8 °C (98.2 °F) 22 1047   Pulse 97 22 1239   Resp 14 22 1047   SpO2 98 % 22 1240

## 2024-08-27 ENCOUNTER — TELEPHONE (OUTPATIENT)
Age: 34
End: 2024-08-27

## 2024-09-09 DIAGNOSIS — Z34.90 PREGNANCY, UNSPECIFIED GESTATIONAL AGE: Primary | ICD-10-CM

## 2024-09-10 ENCOUNTER — CLINICAL DOCUMENTATION (OUTPATIENT)
Age: 34
End: 2024-09-10

## 2024-09-10 ENCOUNTER — PROCEDURE VISIT (OUTPATIENT)
Age: 34
End: 2024-09-10

## 2024-09-10 ENCOUNTER — TELEPHONE (OUTPATIENT)
Age: 34
End: 2024-09-10

## 2024-09-10 ENCOUNTER — INITIAL PRENATAL (OUTPATIENT)
Age: 34
End: 2024-09-10

## 2024-09-10 VITALS
WEIGHT: 200 LBS | HEIGHT: 62 IN | SYSTOLIC BLOOD PRESSURE: 124 MMHG | BODY MASS INDEX: 36.8 KG/M2 | DIASTOLIC BLOOD PRESSURE: 82 MMHG

## 2024-09-10 DIAGNOSIS — Z34.90 PRE-BIRTH VISIT FOR EXPECTANT MOTHER: Primary | ICD-10-CM

## 2024-09-10 DIAGNOSIS — Z34.90 PREGNANCY, UNSPECIFIED GESTATIONAL AGE: Primary | ICD-10-CM

## 2024-09-10 LAB
C. TRACHOMATIS, EXTERNAL RESULT: NEGATIVE
N. GONORRHOEAE, EXTERNAL RESULT: NEGATIVE

## 2024-09-10 PROCEDURE — 0500F INITIAL PRENATAL CARE VISIT: CPT | Performed by: OBSTETRICS & GYNECOLOGY

## 2024-09-10 RX ORDER — ONDANSETRON 4 MG/1
4 TABLET, ORALLY DISINTEGRATING ORAL 3 TIMES DAILY PRN
Qty: 21 TABLET | Refills: 0 | Status: SHIPPED | OUTPATIENT
Start: 2024-09-10

## 2024-09-10 ASSESSMENT — PATIENT HEALTH QUESTIONNAIRE - PHQ9
1. LITTLE INTEREST OR PLEASURE IN DOING THINGS: NOT AT ALL
SUM OF ALL RESPONSES TO PHQ QUESTIONS 1-9: 0
SUM OF ALL RESPONSES TO PHQ9 QUESTIONS 1 & 2: 0
2. FEELING DOWN, DEPRESSED OR HOPELESS: NOT AT ALL
SUM OF ALL RESPONSES TO PHQ QUESTIONS 1-9: 0

## 2024-09-10 NOTE — PROGRESS NOTES
TA ultrasound performed    A single viable 9W5D with an DEEPTI of 04/10/2025 iup is seen with normal cardiac rhythm.  Gestational age based on today's exam.     A normal yolk sac is seen.    Right ovary appears WNL.    Left ovary appears WNL.    No free fluid is seen in the cds.

## 2024-09-11 LAB
BACTERIA SPEC CULT: NORMAL
SERVICE CMNT-IMP: NORMAL

## 2024-09-12 LAB
C TRACH RRNA SPEC QL NAA+PROBE: NEGATIVE
N GONORRHOEA RRNA SPEC QL NAA+PROBE: NEGATIVE
SPECIMEN SOURCE: NORMAL
T VAGINALIS RRNA SPEC QL NAA+PROBE: NEGATIVE

## 2024-10-01 ENCOUNTER — TELEPHONE (OUTPATIENT)
Age: 34
End: 2024-10-01

## 2024-10-01 ENCOUNTER — ROUTINE PRENATAL (OUTPATIENT)
Age: 34
End: 2024-10-01

## 2024-10-01 VITALS
SYSTOLIC BLOOD PRESSURE: 126 MMHG | RESPIRATION RATE: 14 BRPM | DIASTOLIC BLOOD PRESSURE: 83 MMHG | OXYGEN SATURATION: 97 % | HEART RATE: 100 BPM | WEIGHT: 198 LBS | TEMPERATURE: 98.2 F | HEIGHT: 61 IN | BODY MASS INDEX: 37.38 KG/M2

## 2024-10-01 DIAGNOSIS — E66.9 OBESITY (BMI 35.0-39.9 WITHOUT COMORBIDITY): ICD-10-CM

## 2024-10-01 DIAGNOSIS — Z34.81 ENCOUNTER FOR SUPERVISION OF OTHER NORMAL PREGNANCY, FIRST TRIMESTER: ICD-10-CM

## 2024-10-01 DIAGNOSIS — Z34.81 ENCOUNTER FOR SUPERVISION OF OTHER NORMAL PREGNANCY IN FIRST TRIMESTER: ICD-10-CM

## 2024-10-01 DIAGNOSIS — Z34.90 PREGNANCY, UNSPECIFIED GESTATIONAL AGE: Primary | ICD-10-CM

## 2024-10-01 DIAGNOSIS — K21.9 GASTROESOPHAGEAL REFLUX DISEASE, UNSPECIFIED WHETHER ESOPHAGITIS PRESENT: ICD-10-CM

## 2024-10-01 LAB
ABO + RH BLD: NORMAL
BLOOD BANK CMNT PATIENT-IMP: NORMAL
BLOOD GROUP ANTIBODIES SERPL: NORMAL
ERYTHROCYTE [DISTWIDTH] IN BLOOD BY AUTOMATED COUNT: 13.3 % (ref 11.5–14.5)
HBV SURFACE AG SER QL: 0.14 INDEX
HBV SURFACE AG SER QL: NEGATIVE
HCT VFR BLD AUTO: 38.3 % (ref 35–47)
HCV AB SER IA-ACNC: 0.1 INDEX
HCV AB SERPL QL IA: NONREACTIVE
HEP B, EXTERNAL RESULT: NEGATIVE
HGB BLD-MCNC: 12.4 G/DL (ref 11.5–16)
HIV 1+2 AB+HIV1 P24 AG SERPL QL IA: NONREACTIVE
HIV 1/2 RESULT COMMENT: NORMAL
MCH RBC QN AUTO: 26.6 PG (ref 26–34)
MCHC RBC AUTO-ENTMCNC: 32.4 G/DL (ref 30–36.5)
MCV RBC AUTO: 82 FL (ref 80–99)
NRBC # BLD: 0 K/UL (ref 0–0.01)
NRBC BLD-RTO: 0 PER 100 WBC
PLATELET # BLD AUTO: 316 K/UL (ref 150–400)
PMV BLD AUTO: 9.3 FL (ref 8.9–12.9)
RBC # BLD AUTO: 4.67 M/UL (ref 3.8–5.2)
RUBV IGG SERPL IA-ACNC: NORMAL IU/ML
SPECIMEN EXP DATE BLD: NORMAL
WBC # BLD AUTO: 7.5 K/UL (ref 3.6–11)

## 2024-10-01 PROCEDURE — 0502F SUBSEQUENT PRENATAL CARE: CPT | Performed by: OBSTETRICS & GYNECOLOGY

## 2024-10-01 RX ORDER — FAMOTIDINE 20 MG/1
20 TABLET, FILM COATED ORAL 2 TIMES DAILY
Qty: 180 TABLET | Refills: 1 | Status: SHIPPED | OUTPATIENT
Start: 2024-10-01

## 2024-10-01 RX ORDER — ONDANSETRON 4 MG/1
4 TABLET, FILM COATED ORAL EVERY 8 HOURS PRN
Qty: 30 TABLET | Refills: 0 | Status: SHIPPED | OUTPATIENT
Start: 2024-10-01

## 2024-10-01 SDOH — ECONOMIC STABILITY: INCOME INSECURITY: HOW HARD IS IT FOR YOU TO PAY FOR THE VERY BASICS LIKE FOOD, HOUSING, MEDICAL CARE, AND HEATING?: NOT HARD AT ALL

## 2024-10-01 SDOH — ECONOMIC STABILITY: FOOD INSECURITY: WITHIN THE PAST 12 MONTHS, YOU WORRIED THAT YOUR FOOD WOULD RUN OUT BEFORE YOU GOT MONEY TO BUY MORE.: NEVER TRUE

## 2024-10-01 SDOH — ECONOMIC STABILITY: FOOD INSECURITY: WITHIN THE PAST 12 MONTHS, THE FOOD YOU BOUGHT JUST DIDN'T LAST AND YOU DIDN'T HAVE MONEY TO GET MORE.: NEVER TRUE

## 2024-10-01 NOTE — PROGRESS NOTES
Patient here for return OB visit at 12w5d. She reports no concerns.    Was suppose to have an 1 hour (early) today but ate banana on the way to the office.   She reports good fetal movement.   She denies vaginal bleeding, loss of fluid, abnormal vaginal discharge, UTI symptoms, cramping, or contractions.       /83 (Site: Right Upper Arm, Position: Sitting)   Pulse 100   Temp 98.2 °F (36.8 °C) (Oral)   Resp 14   Ht 1.549 m (5' 1\")   Wt 89.8 kg (198 lb)   LMP 2024 (Exact Date)   SpO2 97%   BMI 37.41 kg/m²        Prenatal Fetal Information  Fetal HR: 155  Movement: Present      Patient Active Problem List    Diagnosis Date Noted    Term pregnancy 2022     delivery due to maternal disorder 2022    Fibroids 2021       Return in about 3 weeks (around 10/22/2024) for follow up in three weeks for early glucola and apt to follow .    Vianey Arroyo MD

## 2024-10-01 NOTE — TELEPHONE ENCOUNTER
Received an referral to have pt seen with M. Scheduled for 11/25 at 9:15 at [facility]. Called pt twice to confirm appt, no answer, left vm. Could not send a MiTio message, no active [employer]

## 2024-10-01 NOTE — PROGRESS NOTES
Preghugh Lilly is 12w5d   Doing well  +FM  Denies LOF, bleeding/spotting, cramping, headache, blurred vision, edema, or RUQ pain.   Reports n/v

## 2024-10-02 LAB — VZV IGG SER IA-ACNC: REACTIVE

## 2024-10-03 LAB — T PALLIDUM AB SER QL IA: NON REACTIVE

## 2024-10-04 LAB
HGB A MFR BLD: 97.2 % (ref 96.4–98.8)
HGB A2 MFR BLD COLUMN CHROM: 2.8 % (ref 1.8–3.2)
HGB F MFR BLD: 0 % (ref 0–2)
HGB FRACT BLD-IMP: NORMAL
HGB S MFR BLD: 0 %

## 2024-10-05 PROBLEM — Z34.90 PREGNANCY: Status: ACTIVE | Noted: 2024-10-05

## 2024-10-08 ENCOUNTER — TELEPHONE (OUTPATIENT)
Age: 34
End: 2024-10-08

## 2024-10-08 LAB
Lab: NORMAL
NTRA 22Q11.2 DELETION SYNDROME POPULATION-BASED RISK TEXT: NORMAL
NTRA 22Q11.2 DELETION SYNDROME RESULT TEXT: NORMAL
NTRA 22Q11.2 DELETION SYNDROME RISK SCORE TEXT: NORMAL
NTRA FETAL FRACTION: NORMAL
NTRA FETAL RHD SUMMARY: NORMAL
NTRA GENDER OF FETUS: NORMAL
NTRA MONOSOMY X AGE-BASED RISK TEXT: NORMAL
NTRA MONOSOMY X RESULT TEXT: NORMAL
NTRA MONOSOMY X RISK SCORE TEXT: NORMAL
NTRA TRIPLOIDY RESULT TEXT: NORMAL
NTRA TRISOMY 13 AGE-BASED RISK TEXT: NORMAL
NTRA TRISOMY 13 RESULT TEXT: NORMAL
NTRA TRISOMY 13 RISK SCORE TEXT: NORMAL
NTRA TRISOMY 18 AGE-BASED RISK TEXT: NORMAL
NTRA TRISOMY 18 RESULT TEXT: NORMAL
NTRA TRISOMY 18 RISK SCORE TEXT: NORMAL
NTRA TRISOMY 21 AGE-BASED RISK TEXT: NORMAL
NTRA TRISOMY 21 RESULT TEXT: NORMAL
NTRA TRISOMY 21 RISK SCORE TEXT: NORMAL

## 2024-10-08 NOTE — TELEPHONE ENCOUNTER
Spoke with patient via telephone call. Name and  confirmed. Patient made aware that results are available and low risk. Patient to  gender results in envelope.

## 2024-10-16 LAB
Lab: ABNORMAL
Lab: POSITIVE
NTRA ALPHA-THALASSEMIA: POSITIVE
NTRA BETA-HEMOGLOBINOPATHIES: NEGATIVE
NTRA CANAVAN DISEASE: NEGATIVE
NTRA CYSTIC FIBROSIS: NEGATIVE
NTRA DUCHENNE/BECKER MUSCULAR DYSTROPHY: NEGATIVE
NTRA FAMILIAL DYSAUTONOMIA: NEGATIVE
NTRA FRAGILE X SYNDROME: NEGATIVE
NTRA GALACTOSEMIA: NEGATIVE
NTRA GAUCHER DISEASE: NEGATIVE
NTRA MEDIUM CHAIN ACYL-COA DEHYDROGENASE DEFICIENCY: NEGATIVE
NTRA POLYCYSTIC KIDNEY DISEASE, AUTOSOMAL RECESSIVE: NEGATIVE
NTRA SMITH-LEMLI-OPITZ SYNDROME: NEGATIVE
NTRA SPINAL MUSCULAR ATROPHY: POSITIVE
NTRA TAY-SACHS DISEASE: NEGATIVE

## 2024-10-18 ENCOUNTER — TELEPHONE (OUTPATIENT)
Age: 34
End: 2024-10-18

## 2024-10-18 NOTE — TELEPHONE ENCOUNTER
Attempted to contact patient via telephone call. Voicemail left for patient to return call to office in efforts to get patient partners information for follow up Sandy testing.

## 2024-10-24 ENCOUNTER — ROUTINE PRENATAL (OUTPATIENT)
Age: 34
End: 2024-10-24

## 2024-10-24 VITALS
WEIGHT: 201.2 LBS | OXYGEN SATURATION: 98 % | BODY MASS INDEX: 37.99 KG/M2 | HEIGHT: 61 IN | SYSTOLIC BLOOD PRESSURE: 126 MMHG | HEART RATE: 91 BPM | RESPIRATION RATE: 14 BRPM | DIASTOLIC BLOOD PRESSURE: 77 MMHG | TEMPERATURE: 97.9 F

## 2024-10-24 DIAGNOSIS — Z34.90 PREGNANCY, UNSPECIFIED GESTATIONAL AGE: Primary | ICD-10-CM

## 2024-10-24 DIAGNOSIS — E66.9 OBESITY (BMI 35.0-39.9 WITHOUT COMORBIDITY): ICD-10-CM

## 2024-10-24 LAB — GLUCOSE 1H P 100 G GLC PO SERPL-MCNC: 144 MG/DL (ref 65–140)

## 2024-10-24 PROCEDURE — 0502F SUBSEQUENT PRENATAL CARE: CPT | Performed by: OBSTETRICS & GYNECOLOGY

## 2024-10-24 NOTE — PROGRESS NOTES
Rocky Lilly is 16w0d   Doing wellDenies LOF, bleeding/spotting, cramping, headache, blurred vision, edema, or RUQ pain.     Unable to get FHR on doppler

## 2024-10-24 NOTE — PROGRESS NOTES
Patient here for return OB visit at 16w0d. She reports no concerns.  She reports good fetal movement.   She denies vaginal bleeding, loss of fluid, abnormal vaginal discharge, UTI symptoms, cramping, or contractions.       /77 (Site: Right Upper Arm, Position: Sitting)   Pulse 91   Temp 97.9 °F (36.6 °C) (Oral)   Resp 14   Ht 1.549 m (5' 1\")   Wt 91.3 kg (201 lb 3.2 oz)   LMP 2024 (Exact Date)   SpO2 98%   BMI 38.02 kg/m²          Prenatal Fetal Information  Movement: Absent      Patient Active Problem List    Diagnosis Date Noted    Pregnancy 10/05/2024     Primary Provider: Cruz   CSx1  EDC by: US/LMP  Social: FOB Sam  IOB labs: Hep B neg.Hep C neg. RPR neg. HIV neg. Varicella/Rubella immune B NEGATIVE Hgb 12.3. normal platelet count. Hemoglobin electrophoresis-normal   Genetic Screening:Panoroma____ Horizon____  Anatomy:  3rd TM labs: GTT___ Hgb___ Plts___  Vaccines:   Flu:__ TDAP:__ RSV ___ 32-36 weeks September-January   Rhogam: B NEGATIVE     GBS:     Postpartum  -Breast/Bottle   -Circ  -Pap      Problem List:  1)Prior CSX1 (Dr. Haywood) delivered at 37 week [facility]  2)Hx of extensive myomectomy with Shabbir Bolivar   -planning repeat CS at 37 weeks   3) ABO:B NEGATIVE          delivery due to maternal disorder 2022    Fibroids 2021       Return in about 4 weeks (around 2024).    Vianey Arroyo MD

## 2024-10-25 ENCOUNTER — TELEPHONE (OUTPATIENT)
Age: 34
End: 2024-10-25

## 2024-10-25 NOTE — TELEPHONE ENCOUNTER
Attempted to reach patient via telephone. Voicemail left for patient to return office call to go over recent results. - patient needs early 3 hr gtt

## 2024-10-28 ENCOUNTER — TELEPHONE (OUTPATIENT)
Age: 34
End: 2024-10-28

## 2024-10-28 NOTE — TELEPHONE ENCOUNTER
Spoke to patient via telephone call. Name and  confirmed. Patient made aware of recent results. 3 hr gtt scheduled. No questions at this time.

## 2024-10-30 ENCOUNTER — LAB (OUTPATIENT)
Age: 34
End: 2024-10-30

## 2024-10-30 DIAGNOSIS — Z13.1 ENCOUNTER FOR SCREENING FOR DIABETES MELLITUS: ICD-10-CM

## 2024-10-30 DIAGNOSIS — Z34.90 PREGNANCY, UNSPECIFIED GESTATIONAL AGE: Primary | ICD-10-CM

## 2024-10-30 LAB
GESTATIONAL 3HR GTT: ABNORMAL
GLUCOSE 1H P 100 G GLC PO SERPL-MCNC: 180 MG/DL (ref 65–180)
GLUCOSE 2 HOUR: 194 MG/DL (ref 65–155)
GLUCOSE P FAST SERPL-MCNC: 94 MG/DL (ref 65–95)
GLUCOSE, 3 HOUR: 156 MG/DL (ref 65–140)

## 2024-10-30 NOTE — PROGRESS NOTES
Patient here for labs only visit. Orders placed for 3 hour glucola per Dr. Arroyo's telephone order.

## 2024-10-31 RX ORDER — GLUCOSAMINE HCL/CHONDROITIN SU 500-400 MG
CAPSULE ORAL
Qty: 200 STRIP | Refills: 3 | OUTPATIENT
Start: 2024-10-31

## 2024-10-31 RX ORDER — LANCETS 30 GAUGE
1 EACH MISCELLANEOUS DAILY
Qty: 100 EACH | Refills: 5 | Status: SHIPPED | OUTPATIENT
Start: 2024-10-31

## 2024-11-01 PROBLEM — O24.419 DM (DIABETES MELLITUS), GESTATIONAL: Status: ACTIVE | Noted: 2024-11-01

## 2024-11-04 ENCOUNTER — OFFICE VISIT (OUTPATIENT)
Age: 34
End: 2024-11-04
Payer: MEDICAID

## 2024-11-04 DIAGNOSIS — O24.419 GESTATIONAL DIABETES MELLITUS (GDM) IN SECOND TRIMESTER, GESTATIONAL DIABETES METHOD OF CONTROL UNSPECIFIED: Primary | ICD-10-CM

## 2024-11-04 PROCEDURE — G0108 DIAB MANAGE TRN  PER INDIV: HCPCS

## 2024-11-04 NOTE — PROGRESS NOTES
[facility]  Diabetes Self-Management Education & Support Program    Reason for Referral: DSMES- GDM -Urgent  Referral Source: Vianey Arroyo MD  Services requested: DSMES       ASSESSMENT    From my perspective, the participant would benefit from DSMES specifically related to reducing risks, healthy eating, monitoring, and physical activity.     During the program, we will focus on providing DSMES that specifically addresses participant's interest in healthy eating, as shown by their reported readiness to change.    The participant would be best served by attending weekly individual sessions.    Notes:   Blood sugars = not checking her blood sugars. Will  her glucometer today . Will start then checking bs. I print her log sent by pre-mariana dept to her by my chart. She is not sure if she should check her blood sugars 1 hr or 2 hrs  after B-L-D. She will contact provider.     Healthy eating = patient able to practice counting CHO       Diabetes Self-Management Education Follow-up Visit: 24; 2024          Clinical Presentation  Rocky Lilly is a 34 y.o.  female referred for diabetes self-management education. Participant has GDM for <1 year. Family history negative for diabetes. Patient reports not receiving DSMES services in the past. Most recent A1c value: No results found for: \"HBA1C\", \"[id number]\", \"LABA1C\"    Diabetes-related medical history:  Acute complications  None     Diabetes-related medications:  Current dosing: This patient does not have an active medication from one of the medication groupers.    Blood Pressure Management  This patient does not have an active medication from one of the medication groupers.      Lipid Management  This patient does not have an active medication from one of the medication groupers.      Clot Prevention  This patient does not have an active medication from one of the medication groupers.    Learning

## 2024-11-13 ENCOUNTER — TELEMEDICINE (OUTPATIENT)
Age: 34
End: 2024-11-13

## 2024-11-13 DIAGNOSIS — O24.419 GESTATIONAL DIABETES MELLITUS (GDM) IN SECOND TRIMESTER, GESTATIONAL DIABETES METHOD OF CONTROL UNSPECIFIED: Primary | ICD-10-CM

## 2024-11-13 NOTE — PROGRESS NOTES
[facility]  Diabetes Self-Management Education & Support Program  Encounter Note      SUMMARY  Diabetes self-care management training was completed related to subjects below . The participant will return PRN,she is done with the GDM classes.      EVALUATION:  - Blood sugars= patient got the wrong test strips- Have the one touch ultra 2 glucometer and had the ultra plus test strips. Will go back to the pharmacy. Advised to ask her provider for a prescription for her test strips.   Patient doing self pay for the glucometer and supplies.     - CGM - have interest on CGM , she will request a prescription. Informed I can educated her on how to use it. She can call me to arrange and appointment.       RECOMMENDATIONS:  - continue with lifestyle changes.      TOPICS DISCUSSED TODAY:  HOW CAN BLOOD GLUCOSE MONITORING HELP ME? 60  Additional topics done , see above.       Next provider visit is scheduled for pre-mariana , 24        SMART GOAL(S)   Will get the right test srips for her meter and start monitoring.   ACHIEVEMENT OF GOAL(S) : 0-24%           DATE DSMES TOPIC EVALUATION     2024   - What is Diabetes?   - Pregnancy Type 1 & 2   - Gestational diabetes and potential Complications   - Gestational diabetes and eye care  - using insulin during pregnancy   - copying and support during pregnancy  - breast feeding and diabetes  - Blood sugar monitoring - how to use control solution  - What are CGM's.   - Glucose targets                                The participant knows  Their type of diabetes: Yes   The basic physiologic defect: Yes  Blood glucose targets: Yes    Patient with good feedback     Will check with her provider for a CGM prescription .    States she lost 5 lbs, following the healhty plate.     Was 200 lbs , now she weight 197 lbs.   Patient very active. She use stairs 4-5x per week, a lot of runs to the grocery store. Will ask provider regarding using the stairs.

## 2024-11-21 ENCOUNTER — ROUTINE PRENATAL (OUTPATIENT)
Age: 34
End: 2024-11-21

## 2024-11-21 VITALS
HEART RATE: 100 BPM | SYSTOLIC BLOOD PRESSURE: 124 MMHG | DIASTOLIC BLOOD PRESSURE: 84 MMHG | OXYGEN SATURATION: 98 % | BODY MASS INDEX: 37.79 KG/M2 | WEIGHT: 200 LBS

## 2024-11-21 DIAGNOSIS — Z34.90 PREGNANCY, UNSPECIFIED GESTATIONAL AGE: Primary | ICD-10-CM

## 2024-11-21 PROCEDURE — 0502F SUBSEQUENT PRENATAL CARE: CPT | Performed by: OBSTETRICS & GYNECOLOGY

## 2024-11-21 RX ORDER — GLUCOSAMINE HCL/CHONDROITIN SU 500-400 MG
CAPSULE ORAL
Qty: 200 STRIP | Refills: 0 | Status: SHIPPED | OUTPATIENT
Start: 2024-11-21

## 2024-11-21 NOTE — PROGRESS NOTES
+FM  Pt report she is well. Has not been able to check her blood sugars due to strips not matching her monitor.

## 2024-11-21 NOTE — PROGRESS NOTES
Patient here for return OB visit at 20w0d. She reports no concerns.  Having a hard time with glucose monitoring because strips were not compatible with monitor.     MFM on Monday   New stripes sent today!    She reports good fetal movement.   She denies vaginal bleeding, loss of fluid, abnormal vaginal discharge, UTI symptoms, cramping, or contractions.       /84   Pulse 100   Wt 90.7 kg (200 lb)   LMP 2024 (Exact Date)   SpO2 98%   BMI 37.79 kg/m²        Patient Active Problem List    Diagnosis Date Noted    DM (diabetes mellitus), gestational 2024    Pregnancy 10/05/2024     Primary Provider: Cruz   CSx1  EDC by: US/LMP  Social: FOB Sam  IOB labs: Hep B neg.Hep C neg. RPR neg. HIV neg. Varicella/Rubella immune B NEGATIVE Hgb 12.3. normal platelet count. Hemoglobin electrophoresis-normal GIRL Sue  Genetic Screening:Panoroma____ Horizon____  Anatomy:  3rd TM labs: GTT early 1 hr GDM Hgb___ Plts___  Vaccines:   Flu:__ TDAP:__ RSV ___ 32-36 weeks September-January   Rhogam: B NEGATIVE     GBS:     Postpartum  -Breast/Bottle   -Circ  -Pap      Problem List:  1)Prior CSX1 (Dr. Haywood) delivered at 37 week [facility]  2)Hx of extensive myomectomy with Shabbir Bolivar   -planning repeat CS at 37 weeks   3) ABO:B NEGATIVE   4) GDM  -s/p DM ed   -MFM on   5) Alpha Thalassemia   - also carrier   -needs genetic counselor          delivery due to maternal disorder 2022    Fibroids 2021       Return in about 3 weeks (around 2024).    Vianey Arroyo MD

## 2024-11-25 ENCOUNTER — ROUTINE PRENATAL (OUTPATIENT)
Age: 34
End: 2024-11-25
Payer: MEDICAID

## 2024-11-25 VITALS — HEART RATE: 98 BPM | DIASTOLIC BLOOD PRESSURE: 57 MMHG | SYSTOLIC BLOOD PRESSURE: 112 MMHG

## 2024-11-25 DIAGNOSIS — O24.410 DIET CONTROLLED GESTATIONAL DIABETES MELLITUS (GDM) IN SECOND TRIMESTER: ICD-10-CM

## 2024-11-25 DIAGNOSIS — O99.212 OBESITY AFFECTING PREGNANCY IN SECOND TRIMESTER, UNSPECIFIED OBESITY TYPE: Primary | ICD-10-CM

## 2024-11-25 DIAGNOSIS — Z3A.20 20 WEEKS GESTATION OF PREGNANCY: ICD-10-CM

## 2024-11-25 DIAGNOSIS — O34.29 UTERINE SCAR FROM PREVIOUS SURGERY AFFECTING PREGNANCY: ICD-10-CM

## 2024-11-25 PROCEDURE — 76811 OB US DETAILED SNGL FETUS: CPT | Performed by: OBSTETRICS & GYNECOLOGY

## 2024-11-25 PROCEDURE — 99024 POSTOP FOLLOW-UP VISIT: CPT | Performed by: OBSTETRICS & GYNECOLOGY

## 2024-11-25 NOTE — PROCEDURES
PATIENT: SKYLER HOLMAN   -  : 1990   -  DOS:2024   -  INTERPRETING PROVIDER:Jose Juares,   Indication  ========    Obesity in pregnancy BMI 37.8    Method  ======    Transabdominal ultrasound examination. View: Suboptimal view: limited by maternal body habitus. Suboptimal view: limited by fetal position    Dating  ======    LMP on: 2024  Cycle: regular cycle  GA by LMP 20 w + 4 d  DEEPTI by LMP: 4/10/2025  Previous Ultrasound on: 9/10/2024  Type of prior assessment: GA  GA at prior assessment date 9 w + 5 d  GA by previous U/S 20 w + 4 d  DEEPTI by previous Ultrasound: 4/10/2025  Ultrasound examination on: 2024  GA by U/S based upon: AC, BPD, Femur, HC  GA by U/S 20 w + 4 d  DEEPTI by U/S: 4/10/2025  Assigned: based on the LMP, selected on 2024  Assigned GA 20 w + 4 d  Assigned DEEPTI: 4/10/2025    Fetal Growth Overview  =================    Exam date        GA              BPD (mm)          HC (mm)              AC (mm)              FL (mm)             HL (mm)             EFW (g)  2024        20w 4d        46.9     33%        183.8    49%        155.5     49%        33.9    44%        33.3     74%        369    50%    Fetal Biometry  ============    Standard  BPD 46.9 mm 20w 1d 33% Hadlock  OFD 67.8 mm 22w 5d 98% Lana  .8 mm 20w 5d 49% Hadlock  Cerebellum tr 21.7 mm 20w 3d 64% Hill  Nuchal fold 5.0 mm  .5 mm 20w 5d 49% Hadlock  Femur 33.9 mm 20w 5d 44% Hadlock  Humerus 33.3 mm 21w 2d 74% Lana   g 20w 4d 50% Hadlock  EFW (lb) 0 lb  EFW (oz) 13 oz  EFW by: Hadlock (BPD-HC-AC-FL)  Extended   4.7 mm  CM 4.9 mm  42% Nicolaides  Nasal bone 6.9 mm  Head / Face / Neck  Nasal bone: present  Other Structures   bpm    General Evaluation  ==============    Cardiac activity present.  bpm. Fetal movements: visualized. Presentation: BREECH  Placenta: Placental site: anterior, appropriate distance from the internal os. Placental edge-to-cervical os distance 10.1

## 2024-11-26 ENCOUNTER — TELEMEDICINE (OUTPATIENT)
Age: 34
End: 2024-11-26
Payer: MEDICAID

## 2024-11-26 DIAGNOSIS — D56.3 ALPHA THALASSEMIA SILENT CARRIER: Primary | ICD-10-CM

## 2024-11-26 DIAGNOSIS — Z3A.20 20 WEEKS GESTATION OF PREGNANCY: ICD-10-CM

## 2024-11-26 DIAGNOSIS — Z14.8 CARRIER OF SPINAL MUSCULAR ATROPHY: ICD-10-CM

## 2024-11-26 DIAGNOSIS — O28.5 ABNORMAL GENETIC TEST IN PREGNANCY: ICD-10-CM

## 2024-11-26 PROCEDURE — 96040 PR MEDICAL GENETICS COUNSELING EACH 30 MINUTES: CPT | Performed by: COUNSELOR

## 2024-11-26 NOTE — PROGRESS NOTES
chromosomes) and may, therefore, have a residual chance of being a carrier for the condition.  Also, 2% of individuals with SMA have a de linda mutation, meaning that only one parent is a carrier.  The carrier rate for spinal muscular atrophy in the general population is approximately 1 in 50. If the two SMN1 copies are on opposite chromosomes, then this individual is predicted not to be a carrier of SMA. If the two SMN1 copies are on the same chromosome, then this individual is predicted to be a carrier of SMA. This assay is unable to distinguish between those two possibilities.     Based on the patient's Black ancestry, the patient's current risk to be a carrier is 1 in 34 based on these test results. We reviewed that because the FOB has already had negative carrier screening for SMA, this couple is not at an increased reproductive risk to have a child with SMA.    Of note, the patient has also had normal non-invasive prenatal testing (NIPT), indicating a <1 in 10,000 risk for Down syndrome, trisomy 18, trisomy 13 and sex chromosomal aneuploidies in the current pregnancy. Results were consistent with a female fetus. Nevertheless, the benefits, risks and limitations of NIPT, ultrasonography and amniocentesis in the diagnosis of fetal aneuploidy were reviewed today.    Amniocentesis is typically performed between 16 and 20 weeks gestation, although it can often be performed earlier or later with reasonable safety, depending on the circumstances of the case. The risk for complication from amniocentesis is 1 in 400.  The accuracy of amniocentesis is greater than 99% for chromosomal abnormalities such as aneuploidy, and approximately 98% for open neural tube defects when used in combination with detailed anatomic ultrasound.  The accuracy of other genetic testing ordered varies depending on the condition being tested for and the laboratory performing the testing.  In addition, there are conditions that cannot be tested

## 2024-12-12 ENCOUNTER — ROUTINE PRENATAL (OUTPATIENT)
Age: 34
End: 2024-12-12

## 2024-12-12 VITALS
WEIGHT: 202 LBS | OXYGEN SATURATION: 98 % | HEART RATE: 98 BPM | DIASTOLIC BLOOD PRESSURE: 72 MMHG | SYSTOLIC BLOOD PRESSURE: 114 MMHG | BODY MASS INDEX: 38.17 KG/M2

## 2024-12-12 DIAGNOSIS — Z34.90 PREGNANCY, UNSPECIFIED GESTATIONAL AGE: Primary | ICD-10-CM

## 2024-12-12 PROCEDURE — 0502F SUBSEQUENT PRENATAL CARE: CPT | Performed by: OBSTETRICS & GYNECOLOGY

## 2024-12-12 NOTE — PROGRESS NOTES
Patient here for return OB visit at 23w0d. She reports no concerns.    Blood sugars:  Fastings: 94-96  PP:<120     Discussed in detail     She reports good fetal movement.   She denies vaginal bleeding, loss of fluid, abnormal vaginal discharge, UTI symptoms, cramping, or contractions.       /72   Pulse 98   Wt 91.6 kg (202 lb)   LMP 2024 (Exact Date)   SpO2 98%   BMI 38.17 kg/m²          Prenatal Fetal Information  Movement: Present      Patient Active Problem List    Diagnosis Date Noted    DM (diabetes mellitus), gestational 2024    Pregnancy 10/05/2024     Primary Provider: Cruz   CSx1  EDC by: US/LMP  Social: FOB Sam  IOB labs: Hep B neg.Hep C neg. RPR neg. HIV neg. Varicella/Rubella immune B NEGATIVE Hgb 12.3. normal platelet count. Hemoglobin electrophoresis-normal GIRL Sue  Genetic Screening:Panoroma____ Horizon____  Anatomy:  3rd TM labs: GTT early 1 hr GDM Hgb___ Plts___  Vaccines:   Flu:__ TDAP:__ RSV ___ 32-36 weeks September-January   Rhogam: B NEGATIVE     GBS:     Postpartum  -Breast/Bottle   -Circ  -Pap      Problem List:  1)Prior CSX1 (Dr. Haywood) delivered at 37 week [facility]  2)Hx of extensive myomectomy with Shabbir Bolivar   -planning repeat CS at 37 weeks   3) ABO:B NEGATIVE   4) GDM  -s/p DM ed   -MFM on   5) Alpha Thalassemia   - also carrier   -needs genetic counselor          delivery due to maternal disorder 2022    Fibroids 2021       Return in about 2 weeks (around 2024) for Fit in .    Vianey Arroyo MD

## 2024-12-27 ENCOUNTER — ROUTINE PRENATAL (OUTPATIENT)
Age: 34
End: 2024-12-27

## 2024-12-27 VITALS
HEIGHT: 61 IN | OXYGEN SATURATION: 98 % | TEMPERATURE: 98.2 F | SYSTOLIC BLOOD PRESSURE: 103 MMHG | BODY MASS INDEX: 38.33 KG/M2 | DIASTOLIC BLOOD PRESSURE: 59 MMHG | HEART RATE: 102 BPM | RESPIRATION RATE: 18 BRPM | WEIGHT: 203 LBS

## 2024-12-27 DIAGNOSIS — Z34.90 PREGNANCY, UNSPECIFIED GESTATIONAL AGE: Primary | ICD-10-CM

## 2024-12-27 PROCEDURE — 0502F SUBSEQUENT PRENATAL CARE: CPT | Performed by: OBSTETRICS & GYNECOLOGY

## 2024-12-27 RX ORDER — ASPIRIN 81 MG/1
81 TABLET ORAL DAILY
Qty: 30 TABLET | Refills: 11 | Status: SHIPPED | OUTPATIENT
Start: 2024-12-27

## 2024-12-27 NOTE — PROGRESS NOTES
Patient here for return OB visit at 25w1d. She reports no concerns.    BG reviewed. Doing great with blood  sugars. Fastin's    Lunch and dinner: 110-115    She reports good fetal movement.   She denies vaginal bleeding, loss of fluid, abnormal vaginal discharge, UTI symptoms, cramping, or contractions.       BP (!) 103/59 (Site: Right Upper Arm, Position: Sitting)   Pulse (!) 102   Temp 98.2 °F (36.8 °C) (Oral)   Resp 18   Ht 1.549 m (5' 1\")   Wt 92.1 kg (203 lb)   LMP 2024 (Exact Date)   SpO2 98%   BMI 38.36 kg/m²          Prenatal Fetal Information  Fetal HR: 140  Movement: Present      Patient Active Problem List    Diagnosis Date Noted    DM (diabetes mellitus), gestational 2024    Pregnancy 10/05/2024     Primary Provider: Cruz   CSx1  EDC by: US/LMP  Social: FOB Sam  IOB labs: Hep B neg.Hep C neg. RPR neg. HIV neg. Varicella/Rubella immune B NEGATIVE Hgb 12.3. normal platelet count. Hemoglobin electrophoresis-normal GIRL Sue  Genetic Screening:Panoroma____ Horizon____  Anatomy:  3rd TM labs: GTT early 1 hr GDM Hgb___ Plts___  Vaccines:   Flu:__ TDAP:__ RSV ___ 32-36 weeks September-January   Rhogam: B NEGATIVE     GBS:     Postpartum  -Breast/Bottle   -Circ  -Pap      Problem List:  1)Prior CSX1 (Dr. Haywood) delivered at 37 week [facility]  2)Hx of extensive myomectomy with Shabbir Bolivar   -planning repeat CS at 37 weeks   3) ABO:B NEGATIVE   4) GDM  -s/p DM ed   -MFM on   5) Alpha Thalassemia   - also carrier   -needs genetic counselor          delivery due to maternal disorder 2022    Fibroids 2021       Return in about 3 weeks (around 2025).    Vianey Arroyo MD  
Rocky Lilly is 25w1d   Doing well  +FM  Denies LOF, bleeding/spotting, cramping, headache, blurred vision, edema, or RUQ pain.     
Anxiety    Anxiety    Depression    Depression    History of supraventricular tachycardia    HTN (hypertension)    Hypertension    Tachycardia

## 2024-12-30 ENCOUNTER — ROUTINE PRENATAL (OUTPATIENT)
Age: 34
End: 2024-12-30
Payer: MEDICAID

## 2024-12-30 VITALS — SYSTOLIC BLOOD PRESSURE: 124 MMHG | DIASTOLIC BLOOD PRESSURE: 81 MMHG | HEART RATE: 100 BPM

## 2024-12-30 DIAGNOSIS — O99.213 OBESITY AFFECTING PREGNANCY IN THIRD TRIMESTER, UNSPECIFIED OBESITY TYPE: Primary | ICD-10-CM

## 2024-12-30 DIAGNOSIS — Z3A.25 25 WEEKS GESTATION OF PREGNANCY: ICD-10-CM

## 2024-12-30 DIAGNOSIS — O34.29 UTERINE SCAR FROM PREVIOUS SURGERY AFFECTING PREGNANCY: ICD-10-CM

## 2024-12-30 DIAGNOSIS — O34.219 H/O CESAREAN SECTION COMPLICATING PREGNANCY: ICD-10-CM

## 2024-12-30 DIAGNOSIS — O24.410 DIET CONTROLLED GESTATIONAL DIABETES MELLITUS (GDM) IN THIRD TRIMESTER: ICD-10-CM

## 2024-12-30 PROCEDURE — 76816 OB US FOLLOW-UP PER FETUS: CPT | Performed by: OBSTETRICS & GYNECOLOGY

## 2024-12-30 PROCEDURE — 99024 POSTOP FOLLOW-UP VISIT: CPT | Performed by: OBSTETRICS & GYNECOLOGY

## 2024-12-30 NOTE — PROCEDURES
PATIENT: SKYLER HOLMAN   -  : 1990   -  DOS:2024   -  INTERPRETING PROVIDER:Jose Juares,   Indication  ========    Obesity in pregnancy BMI 37.8, GDM    Method  ======    Transabdominal ultrasound examination. View: suboptimal due to maternal acoustic properties. suboptimal due to advanced gestational age    Pregnancy  =========    Negro pregnancy. Number of fetuses: 1    Dating  ======    LMP on: 2024  Cycle: regular cycle  GA by LMP 25 w + 4 d  DEEPTI by LMP: 4/10/2025  Previous Ultrasound on: 9/10/2024  Type of prior assessment: GA  GA at prior assessment date 9 w + 5 d  GA by previous U/S 25 w + 4 d  DEEPTI by previous Ultrasound: 4/10/2025  Ultrasound examination on: 2024  GA by U/S based upon: AC, BPD, Femur, HC  GA by U/S 26 w + 4 d  DEEPTI by U/S: 4/3/2025  Assigned: based on the LMP, selected on 2024  Assigned GA 25 w + 4 d  Assigned DEEPTI: 4/10/2025    Fetal Biometry  ============    Standard  BPD 68.2 mm 27w 3d 93% Hadlock  OFD 81.5 mm 26w 4d 77% Lana  .1 mm 25w 5d 33% Hadlock  .7 mm 26w 1d 60% Hadlock  Femur 50.2 mm 27w 0d 78% Hadlock   g 26w 1d 77% Hadlock  EFW (lb) 2 lb  EFW (oz) 1 oz  EFW by: Hadlock (BPD-HC-AC-FL)  Head / Face / Neck  Nasal bone: present  Other Structures   bpm    General Evaluation  ==============    Cardiac activity present.  bpm. Fetal movements: visualized. Presentation: Cephalic  Placenta: Placental site: anterior, appropriate distance from the internal os  Umbilical cord: Cord vessels: 3 vessel cord. Insertion site: central  Amniotic fluid: Amount of AF: normal. MVP 6.2 cm    Fetal Anatomy  ===========    Face  Nasal bone: present  Stomach: normal  Kidneys: normal  Bladder: normal  Lt hand: normal  Lt fingers: normal  Rt foot: normal  Rt toes: normal  Lt foot: normal  Lt toes: normal  Wants to know fetal sex: yes    Findings  =======    Intrauterine Negro pregnancy at 25w 4d by clinical dates.  EFW is 942 g at

## 2025-01-17 ENCOUNTER — ROUTINE PRENATAL (OUTPATIENT)
Age: 35
End: 2025-01-17

## 2025-01-17 VITALS
WEIGHT: 206 LBS | HEART RATE: 100 BPM | OXYGEN SATURATION: 98 % | BODY MASS INDEX: 38.92 KG/M2 | SYSTOLIC BLOOD PRESSURE: 114 MMHG | DIASTOLIC BLOOD PRESSURE: 77 MMHG

## 2025-01-17 DIAGNOSIS — Z13.1 ENCOUNTER FOR SCREENING FOR DIABETES MELLITUS: ICD-10-CM

## 2025-01-17 DIAGNOSIS — Z34.90 PREGNANCY, UNSPECIFIED GESTATIONAL AGE: Primary | ICD-10-CM

## 2025-01-17 LAB
BLOOD BANK CMNT PATIENT-IMP: NORMAL
BLOOD GROUP ANTIBODIES SERPL: NORMAL

## 2025-01-17 RX ORDER — LANCETS 30 GAUGE
1 EACH MISCELLANEOUS DAILY
Qty: 100 EACH | Refills: 5 | Status: SHIPPED | OUTPATIENT
Start: 2025-01-17

## 2025-01-17 RX ORDER — CALCIUM CARBONATE 500 MG/1
1 TABLET, CHEWABLE ORAL DAILY
COMMUNITY

## 2025-01-17 RX ORDER — GLUCOSAMINE HCL/CHONDROITIN SU 500-400 MG
CAPSULE ORAL
Qty: 200 STRIP | Refills: 3 | Status: SHIPPED | OUTPATIENT
Start: 2025-01-17

## 2025-01-17 NOTE — PROGRESS NOTES
+    Pt reports with no medical concerns.  
Orders placed per Dr. Arroyo's verbal order.    
Patient here for return OB visit at 28w1d. She reports no concerns. Blood glucose has been well controlled.     She reports good fetal movement.   She denies vaginal bleeding, loss of fluid, abnormal vaginal discharge, UTI symptoms, cramping, or contractions.       /77   Wt 93.4 kg (206 lb)   LMP 2024 (Exact Date)   SpO2 98%   BMI 38.92 kg/m²          Patient Active Problem List    Diagnosis Date Noted    DM (diabetes mellitus), gestational 2024    Pregnancy 10/05/2024     Primary Provider: Cruz   CSx1  EDC by: US/LMP  Social: FOB Sam  IOB labs: Hep B neg.Hep C neg. RPR neg. HIV neg. Varicella/Rubella immune B NEGATIVE Hgb 12.3. normal platelet count. Hemoglobin electrophoresis-normal GIRL Sue  Genetic Screening:Panoroma____ Horizon____  Anatomy:  3rd TM labs: GTT early 1 hr GDM Hgb___ Plts___  Vaccines:   Flu:declines TDAP: 25 RSV ___ 32-36 weeks September-January   Rhogam: B NEGATIVE     GBS:     Postpartum  -Breast/pump  -Pap  -Pills?      Problem List:  1)Prior CSX1 (Dr. Haywood) delivered at 37 week [facility]  2)Hx of extensive myomectomy with Shabbir Bolivar   -planning repeat CS at 37 weeks   3) ABO:B NEGATIVE   4) GDM  -s/p DM ed   -MFM on   5) Alpha Thalassemia   - also carrier   -needs genetic counselor          delivery due to maternal disorder 2022    Fibroids 2021       Return for 2, 4, 6, 8 weeks.    Vianey Arroyo MD  
n/a/yes

## 2025-01-18 LAB
ERYTHROCYTE [DISTWIDTH] IN BLOOD BY AUTOMATED COUNT: 13.5 % (ref 11.5–14.5)
HCT VFR BLD AUTO: 32.9 % (ref 35–47)
HGB BLD-MCNC: 10.4 G/DL (ref 11.5–16)
MCH RBC QN AUTO: 25.9 PG (ref 26–34)
MCHC RBC AUTO-ENTMCNC: 31.6 G/DL (ref 30–36.5)
MCV RBC AUTO: 82 FL (ref 80–99)
NRBC # BLD: 0 K/UL (ref 0–0.01)
NRBC BLD-RTO: 0 PER 100 WBC
PLATELET # BLD AUTO: 308 K/UL (ref 150–400)
PMV BLD AUTO: 9.6 FL (ref 8.9–12.9)
RBC # BLD AUTO: 4.01 M/UL (ref 3.8–5.2)
WBC # BLD AUTO: 9.1 K/UL (ref 3.6–11)

## 2025-01-19 RX ORDER — FERROUS SULFATE 325(65) MG
325 TABLET, DELAYED RELEASE (ENTERIC COATED) ORAL
Qty: 30 TABLET | Refills: 11 | Status: SHIPPED | OUTPATIENT
Start: 2025-01-19

## 2025-01-20 LAB — T PALLIDUM AB SER QL IA: NON REACTIVE

## 2025-01-27 ENCOUNTER — ROUTINE PRENATAL (OUTPATIENT)
Age: 35
End: 2025-01-27
Payer: MEDICAID

## 2025-01-27 VITALS — HEART RATE: 104 BPM | DIASTOLIC BLOOD PRESSURE: 74 MMHG | SYSTOLIC BLOOD PRESSURE: 115 MMHG

## 2025-01-27 DIAGNOSIS — O24.414 INSULIN CONTROLLED GESTATIONAL DIABETES MELLITUS (GDM) IN THIRD TRIMESTER: ICD-10-CM

## 2025-01-27 DIAGNOSIS — O26.90 PREGNANCY COMPLICATION, ANTEPARTUM: ICD-10-CM

## 2025-01-27 DIAGNOSIS — Z3A.29 29 WEEKS GESTATION OF PREGNANCY: Primary | ICD-10-CM

## 2025-01-27 DIAGNOSIS — O99.210 OBESITY IN PREGNANCY, ANTEPARTUM: ICD-10-CM

## 2025-01-27 DIAGNOSIS — Z98.891 HISTORY OF C-SECTION: ICD-10-CM

## 2025-01-27 PROCEDURE — 76816 OB US FOLLOW-UP PER FETUS: CPT | Performed by: STUDENT IN AN ORGANIZED HEALTH CARE EDUCATION/TRAINING PROGRAM

## 2025-01-27 PROCEDURE — 99214 OFFICE O/P EST MOD 30 MIN: CPT | Performed by: STUDENT IN AN ORGANIZED HEALTH CARE EDUCATION/TRAINING PROGRAM

## 2025-01-27 RX ORDER — INSULIN HUMAN 100 [IU]/ML
10 INJECTION, SUSPENSION SUBCUTANEOUS NIGHTLY
Qty: 5 ADJUSTABLE DOSE PRE-FILLED PEN SYRINGE | Refills: 3 | Status: SHIPPED | OUTPATIENT
Start: 2025-01-27

## 2025-01-27 RX ORDER — UBIQUINOL 100 MG
CAPSULE ORAL
Qty: 200 EACH | Refills: 3 | Status: SHIPPED | OUTPATIENT
Start: 2025-01-27

## 2025-01-27 NOTE — PROCEDURES
PATIENT: SKYLER HOLMAN   -  : 1990   -  DOS:2025   -  INTERPRETING PROVIDER:Kimi Espinosa,   Indication  ========    maObesity in pregnancy BMI 37.8, GDM    Method  ======    Transabdominal ultrasound examination. View: Sufficient    Pregnancy  =========    Negro pregnancy. Number of fetuses: 1    Dating  ======    LMP on: 2024  Cycle: regular cycle  GA by LMP 29 w + 4 d  DEEPTI by LMP: 4/10/2025  Previous Ultrasound on: 9/10/2024  Type of prior assessment: GA  GA at prior assessment date 9 w + 5 d  GA by previous U/S 29 w + 4 d  DEEPTI by previous Ultrasound: 4/10/2025  Ultrasound examination on: 2025  GA by U/S based upon: AC, BPD, Femur, HC  GA by U/S 29 w + 6 d  DEEPTI by U/S: 2025  Assigned: based on the LMP, selected on 2024  Assigned GA 29 w + 4 d  Assigned DEEPTI: 4/10/2025    Fetal Biometry  ============    Standard  BPD 76.3 mm 30w 4d 71% Hadlock  OFD 93.8 mm 30w 2d 69% Lana  .0 mm 29w 4d 17% Hadlock  .7 mm 29w 2d 35% Hadlock  Femur 57.5 mm 30w 1d 51% Hadlock  EFW 1,435 g 29w 2d 41% Hadlock  EFW (lb) 3 lb  EFW (oz) 3 oz  EFW by: Hadlock (BPD-HC-AC-FL)  Head / Face / Neck  Nasal bone: present  Other Structures   bpm    General Evaluation  ==============    Cardiac activity present.  bpm. Fetal movements: visualized. Presentation: Cephalic  Placenta: Placental site: anterior, appropriate distance from the internal os  Umbilical cord: Cord vessels: 3 vessel cord. Insertion site: central  Amniotic fluid: Amount of AF: normal. MVP 4.7 cm. MARIN 11.2 cm. Q1 3.4 cm, Q2 0.0 cm, Q3 4.7 cm, Q4 3.1 cm    Fetal Anatomy  ===========    Face  Nasal bone: present  Stomach: normal  Kidneys: normal  Bladder: normal  Wants to know fetal sex: yes    Findings  =======    Intrauterine Negro pregnancy at 29w 4d by clinical dates.  EFW is 1435 g at 41%, abdominal circumference at 35%.  Amniotic fluid: normal.  Placenta is anterior, appropriate distance from the internal

## 2025-01-27 NOTE — PROGRESS NOTES
Patient was seen 1/27/2025      Please look under media to view full consult and ultrasound report in ViewPoint.         Kimi Espinosa MD   Maternal Fetal Medicine

## 2025-01-27 NOTE — PROGRESS NOTES
Educated patient on insulin administration. Patient verbalized and demonstrated understanding. Informed patient on how to send logs in through [employer]. Reviewed hypoglycemia protocol and when to check blood sugars. Patient and partner asked appropriate questions.

## 2025-02-07 RX ORDER — ASPIRIN 81 MG/1
81 TABLET ORAL DAILY
Qty: 30 TABLET | Refills: 11 | OUTPATIENT
Start: 2025-02-07

## 2025-02-11 SDOH — ECONOMIC STABILITY: INCOME INSECURITY: IN THE LAST 12 MONTHS, WAS THERE A TIME WHEN YOU WERE NOT ABLE TO PAY THE MORTGAGE OR RENT ON TIME?: NO

## 2025-02-11 SDOH — ECONOMIC STABILITY: FOOD INSECURITY: WITHIN THE PAST 12 MONTHS, YOU WORRIED THAT YOUR FOOD WOULD RUN OUT BEFORE YOU GOT MONEY TO BUY MORE.: OFTEN TRUE

## 2025-02-11 SDOH — ECONOMIC STABILITY: FOOD INSECURITY: WITHIN THE PAST 12 MONTHS, THE FOOD YOU BOUGHT JUST DIDN'T LAST AND YOU DIDN'T HAVE MONEY TO GET MORE.: OFTEN TRUE

## 2025-02-11 SDOH — ECONOMIC STABILITY: TRANSPORTATION INSECURITY
IN THE PAST 12 MONTHS, HAS LACK OF TRANSPORTATION KEPT YOU FROM MEETINGS, WORK, OR FROM GETTING THINGS NEEDED FOR DAILY LIVING?: YES

## 2025-02-11 SDOH — ECONOMIC STABILITY: TRANSPORTATION INSECURITY
IN THE PAST 12 MONTHS, HAS THE LACK OF TRANSPORTATION KEPT YOU FROM MEDICAL APPOINTMENTS OR FROM GETTING MEDICATIONS?: NO

## 2025-02-11 NOTE — PROGRESS NOTES
Patient here for return OB visit at 32w0d      She reports good fetal movement.   She denies vaginal bleeding, loss of fluid, abnormal vaginal discharge, UTI symptoms, cramping, or contractions.       /70 (Site: Right Upper Arm, Position: Sitting)   Pulse (!) 105   Temp 97.5 °F (36.4 °C) (Oral)   Resp 16   Ht 1.549 m (5' 1\")   Wt 93.4 kg (206 lb)   LMP 2024 (Exact Date)   SpO2 98%   BMI 38.92 kg/m²        Patient Active Problem List    Diagnosis Date Noted    DM (diabetes mellitus), gestational 2024    Pregnancy 10/05/2024     Primary Provider: Cruz   CSx1  EDC by: US/LMP  Social: FOB Sam  IOB labs: Hep B neg.Hep C neg. RPR neg. HIV neg. Varicella/Rubella immune B NEGATIVE Hgb 12.3. normal platelet count. Hemoglobin electrophoresis-normal GIRL Sue  Genetic Screening:Panoroma low risk female Horizon SMA alpha thal carrier   Anatomy: NL with MFM  3rd TM labs: GTT early 1 hr GDM Hgb___ Plts___  Vaccines:   Flu:declines TDAP: 25 RSV ___ 32-36 weeks September-January   Rhogam: B NEGATIVE     GBS:     Postpartum  -Breast/pump  -Pap  -Pills?      Problem List:  1)Prior CSX1 (Dr. Haywood) delivered at 37 week [facility]  2)Hx of extensive myomectomy with Shabbir Bolivar   -planning repeat CS at 37 weeks   3) ABO:B NEGATIVE   -Rhogam given 25  4) A2GDM NPH 10 units qhs   -s/p DM ed   -MFM on     5) Alpha Thalassemia   - also carrier   -needs genetic counselor          delivery due to maternal disorder 2022    Fibroids 2021       Return in about 2 weeks (around 2025).    Vianey Arroyo MD

## 2025-02-12 ENCOUNTER — NURSE ONLY (OUTPATIENT)
Age: 35
End: 2025-02-12

## 2025-02-12 RX ORDER — UBIQUINOL 100 MG
CAPSULE ORAL
Qty: 200 EACH | Refills: 3 | Status: SHIPPED | OUTPATIENT
Start: 2025-02-12

## 2025-02-12 RX ORDER — GLUCOSAMINE HCL/CHONDROITIN SU 500-400 MG
CAPSULE ORAL
Qty: 200 STRIP | Refills: 3 | Status: SHIPPED | OUTPATIENT
Start: 2025-02-12

## 2025-02-12 RX ORDER — FERROUS SULFATE 325(65) MG
325 TABLET, DELAYED RELEASE (ENTERIC COATED) ORAL
Qty: 30 TABLET | Refills: 11 | Status: SHIPPED | OUTPATIENT
Start: 2025-02-12

## 2025-02-13 ENCOUNTER — ROUTINE PRENATAL (OUTPATIENT)
Age: 35
End: 2025-02-13
Payer: MEDICAID

## 2025-02-13 ENCOUNTER — ROUTINE PRENATAL (OUTPATIENT)
Age: 35
End: 2025-02-13

## 2025-02-13 VITALS
BODY MASS INDEX: 38.89 KG/M2 | RESPIRATION RATE: 16 BRPM | WEIGHT: 206 LBS | HEART RATE: 105 BPM | TEMPERATURE: 97.5 F | SYSTOLIC BLOOD PRESSURE: 103 MMHG | HEIGHT: 61 IN | DIASTOLIC BLOOD PRESSURE: 70 MMHG | OXYGEN SATURATION: 98 %

## 2025-02-13 VITALS — HEART RATE: 108 BPM | SYSTOLIC BLOOD PRESSURE: 113 MMHG | DIASTOLIC BLOOD PRESSURE: 75 MMHG

## 2025-02-13 DIAGNOSIS — O24.419 GESTATIONAL DIABETES MELLITUS (GDM), ANTEPARTUM, GESTATIONAL DIABETES METHOD OF CONTROL UNSPECIFIED: Primary | ICD-10-CM

## 2025-02-13 DIAGNOSIS — Z34.90 PREGNANCY, UNSPECIFIED GESTATIONAL AGE: Primary | ICD-10-CM

## 2025-02-13 PROCEDURE — 76819 FETAL BIOPHYS PROFIL W/O NST: CPT | Performed by: OBSTETRICS & GYNECOLOGY

## 2025-02-13 PROCEDURE — 0502F SUBSEQUENT PRENATAL CARE: CPT | Performed by: OBSTETRICS & GYNECOLOGY

## 2025-02-13 PROCEDURE — 99213 OFFICE O/P EST LOW 20 MIN: CPT | Performed by: OBSTETRICS & GYNECOLOGY

## 2025-02-13 ASSESSMENT — PATIENT HEALTH QUESTIONNAIRE - PHQ9
1. LITTLE INTEREST OR PLEASURE IN DOING THINGS: NOT AT ALL
SUM OF ALL RESPONSES TO PHQ QUESTIONS 1-9: 0
2. FEELING DOWN, DEPRESSED OR HOPELESS: NOT AT ALL
SUM OF ALL RESPONSES TO PHQ QUESTIONS 1-9: 0
SUM OF ALL RESPONSES TO PHQ QUESTIONS 1-9: 0
SUM OF ALL RESPONSES TO PHQ9 QUESTIONS 1 & 2: 0
SUM OF ALL RESPONSES TO PHQ QUESTIONS 1-9: 0

## 2025-02-13 NOTE — PROGRESS NOTES
Preghugh Lilly is 32w0d   Doing well  +FM  Denies LOF, bleeding/spotting, cramping, headache, blurred vision, edema, or RUQ pain.

## 2025-02-13 NOTE — PROGRESS NOTES
Rocky Lilly is a 34 y.o. female    Chief Complaint   Patient presents with    Pregnancy Ultrasound       /75   Pulse (!) 108   LMP 07/04/2024 (Exact Date)         1. Have you been to the ER, urgent care clinic since your last visit?  Hospitalized since your last visit? No    2. Have you seen or consulted any other health care providers outside of the [facility] since your last visit?  Include any pap smears or colon screening. No    Learning Assessment:       No data to display                Fall Risk Assessment:      12/3/2022     9:00 AM 12/3/2022     1:15 AM 12/2/2022     8:15 PM 12/2/2022     4:00 PM 12/2/2022     7:52 AM 12/2/2022     2:03 AM 12/1/2022     7:55 PM   Amb Fall Risk Assessment and TUG Test   Total Score 0 0 0 0 0 0 1       Abuse Screening:       No data to display                ADL Screening:       No data to display

## 2025-02-13 NOTE — PROGRESS NOTES
Patient was seen 2/13/2025      Please look under media to view full consult and ultrasound report in ViewPoint.         Redd Silva MD  Maternal Fetal Medicine

## 2025-02-13 NOTE — PROCEDURES
PATIENT: SKYLER HOLMAN   -  : 1990   -  DOS:2025   -  INTERPRETING PROVIDER:Redd Silva,   Indication  ========    Obesity in pregnancy BMI 37.8, GDM    Method  ======    Transabdominal ultrasound examination. View: Sufficient    Pregnancy  =========    Negro pregnancy. Number of fetuses: 1    Dating  ======    LMP on: 2024  Cycle: regular cycle  GA by LMP 32 w + 0 d  DEEPTI by LMP: 4/10/2025  Previous Ultrasound on: 9/10/2024  Type of prior assessment: GA  GA at prior assessment date 9 w + 5 d  GA by previous U/S 32 w + 0 d  DEEPTI by previous Ultrasound: 4/10/2025  Assigned: based on the LMP, selected on 2024  Assigned GA 32 w + 0 d  Assigned DEEPTI: 4/10/2025    General Evaluation  ==============    Cardiac activity present.  bpm. Fetal movements: visualized. Presentation: Cephalic  Placenta: Placental site: anterior, appropriate distance from the internal os  Umbilical cord: Cord vessels: 3 vessel cord    Fetal Biometry  ============    Standard  EFW by: Hadlock (BPD-HC-AC-FL)  Head / Face / Neck  Nasal bone: present  Other Structures   bpm    Fetal Anatomy  ===========    Face  Nasal bone: present  Stomach: normal  Kidneys: normal  Bladder: normal  Wants to know fetal sex: yes    Amniotic Fluid Assessment  =====================    Amount of AF: normal  MVP 4.1 cm. MARIN 12.0 cm. Q1 4.1 cm, Q2 4.0 cm, Q3 1.3 cm, Q4 2.7 cm    Biophysical Profile  ==============    2: Fetal breathing movements  2: Gross body movements  2: Fetal tone  2: Amniotic fluid volume  8/8 Biophysical profile score    Findings  =======    Intrauterine Negro pregnancy at 32w 0d by clinical dates.  Amniotic fluid: normal.  Placenta is anterior, appropriate distance from the internal os.  Cephalic presentation.  Biophysical profile score is 8/8.    The ultrasound findings as listed above and diagnostic limitations of ultrasound imaging, including inability to exclude all anomalies, have been reviewed

## 2025-02-19 ENCOUNTER — TELEPHONE (OUTPATIENT)
Age: 35
End: 2025-02-19

## 2025-02-20 ENCOUNTER — ROUTINE PRENATAL (OUTPATIENT)
Age: 35
End: 2025-02-20
Payer: MEDICAID

## 2025-02-20 VITALS — DIASTOLIC BLOOD PRESSURE: 62 MMHG | SYSTOLIC BLOOD PRESSURE: 126 MMHG | HEART RATE: 62 BPM

## 2025-02-20 DIAGNOSIS — O24.419 GESTATIONAL DIABETES MELLITUS (GDM), ANTEPARTUM, GESTATIONAL DIABETES METHOD OF CONTROL UNSPECIFIED: Primary | ICD-10-CM

## 2025-02-20 PROCEDURE — 76816 OB US FOLLOW-UP PER FETUS: CPT | Performed by: STUDENT IN AN ORGANIZED HEALTH CARE EDUCATION/TRAINING PROGRAM

## 2025-02-20 PROCEDURE — 76818 FETAL BIOPHYS PROFILE W/NST: CPT | Performed by: STUDENT IN AN ORGANIZED HEALTH CARE EDUCATION/TRAINING PROGRAM

## 2025-02-20 PROCEDURE — 99214 OFFICE O/P EST MOD 30 MIN: CPT | Performed by: STUDENT IN AN ORGANIZED HEALTH CARE EDUCATION/TRAINING PROGRAM

## 2025-02-20 NOTE — PROGRESS NOTES
Patient was seen 2/20/2025      Please look under media to view full consult and ultrasound report in ViewPoint.         Kimi Espinosa MD   Maternal Fetal Medicine

## 2025-02-20 NOTE — PROCEDURES
PATIENT: SKYLER HOLMAN   -  : 1990   -  DOS:2025   -  INTERPRETING PROVIDER:Kimi Espinosa,   Indication  ========    Obesity in pregnancy BMI 37.8, GDM    Method  ======    Transabdominal ultrasound examination. View: Sufficient    Pregnancy  =========    Negro pregnancy. Number of fetuses: 1    Dating  ======    LMP on: 2024  Cycle: regular cycle  GA by LMP 33 w + 0 d  DEEPTI by LMP: 4/10/2025  Previous Ultrasound on: 9/10/2024  Type of prior assessment: GA  GA at prior assessment date 9 w + 5 d  GA by previous U/S 33 w + 0 d  DEEPTI by previous Ultrasound: 4/10/2025  Ultrasound examination on: 2025  GA by U/S based upon: AC, BPD, Femur, HC  GA by U/S 33 w + 0 d  DEEPTI by U/S: 4/10/2025  Assigned: based on the LMP, selected on 2024  Assigned GA 33 w + 0 d  Assigned DEEPTI: 4/10/2025    Fetal Biometry  ============    Standard  BPD 82.5 mm 33w 1d 49% Hadlock  .4 mm 33w 1d 54% Lana  .2 mm 32w 3d 8% Hadlock  Cerebellum tr 44.5 mm 34w 3d 73% Hill  .7 mm 33w 6d 75% Hadlock  Femur 62.7 mm 32w 3d 24% Hadlock  EFW 2,152 g 33w 0d 49% Hadlock  EFW (lb) 4 lb  EFW (oz) 12 oz  EFW by: Hadlock (BPD-HC-AC-FL)  Head / Face / Neck  Nasal bone: present  Other Structures   bpm    General Evaluation  ==============    Cardiac activity present.  bpm. Fetal movements: visualized. Presentation: Cephalic  Placenta: Placental site: anterior, appropriate distance from the internal os  Umbilical cord: Cord vessels: 3 vessel cord  Amniotic fluid: Amount of AF: normal. MVP 6.0 cm. MARIN 18.8 cm. Q1 4.4 cm, Q2 4.4 cm, Q3 6.0 cm, Q4 4.0 cm    Fetal Anatomy  ===========    Face  Nasal bone: present  Stomach: normal  Kidneys: normal  Bladder: normal  Wants to know fetal sex: yes    Biophysical Profile  ==============    2: Fetal breathing movements  2: Gross body movements  2: Fetal tone  2: Amniotic fluid volume  NST: reactive  10/10 Biophysical profile score    Non Stress

## 2025-02-25 NOTE — PROGRESS NOTES
Patient here for return OB visit at 34w0d       She reports good fetal movement.   She denies vaginal bleeding, loss of fluid, abnormal vaginal discharge, UTI symptoms, cramping, or contractions.       /75 (Site: Right Upper Arm, Position: Sitting)   Pulse 96   Temp 97.8 °F (36.6 °C) (Oral)   Resp 16   Ht 1.549 m (5' 1\")   Wt 93.9 kg (207 lb)   LMP 2024 (Exact Date)   SpO2 95%   BMI 39.11 kg/m²           Patient Active Problem List    Diagnosis Date Noted    DM (diabetes mellitus), gestational 2024    Pregnancy 10/05/2024     Primary Provider: Cruz   CSx1  EDC by: US/LMP  Social: FOB Sam  IOB labs: Hep B neg.Hep C neg. RPR neg. HIV neg. Varicella/Rubella immune B NEGATIVE Hgb 12.3. normal platelet count. Hemoglobin electrophoresis-normal GIRL Sue  Genetic Screening:[place] low risk female Horizon SMA alpha thal carrier   Anatomy: NL with MFM  3rd TM labs: GTT early 1 hr GDM Hgb___ Plts___  Vaccines:   Flu:declines TDAP: 25 RSV ___ 32-36 weeks September-January   Rhogam: B NEGATIVE     GBS:     Postpartum  -Breast/pump  -Pap  -Pills?      Problem List:  1)Prior CSX1 (Dr. Haywood) delivered at 37 week [facility]  2)Hx of extensive myomectomy with Shabbir Bolivar   -planning repeat CS at 37 weeks   3) ABO:B NEGATIVE   -Rhogam given 25  4) A2GDM NPH 10 units qhs   -s/p DM ed   -MFM on     5) Alpha Thalassemia   - also carrier   -needs genetic counselor          delivery due to maternal disorder 2022    Fibroids 2021       No follow-ups on file.    Vianey Arroyo MD

## 2025-02-27 ENCOUNTER — ROUTINE PRENATAL (OUTPATIENT)
Age: 35
End: 2025-02-27
Payer: MEDICAID

## 2025-02-27 ENCOUNTER — ROUTINE PRENATAL (OUTPATIENT)
Age: 35
End: 2025-02-27

## 2025-02-27 VITALS
SYSTOLIC BLOOD PRESSURE: 116 MMHG | HEART RATE: 96 BPM | HEIGHT: 61 IN | DIASTOLIC BLOOD PRESSURE: 75 MMHG | BODY MASS INDEX: 39.08 KG/M2 | OXYGEN SATURATION: 95 % | TEMPERATURE: 97.8 F | RESPIRATION RATE: 16 BRPM | WEIGHT: 207 LBS

## 2025-02-27 VITALS — SYSTOLIC BLOOD PRESSURE: 120 MMHG | HEART RATE: 83 BPM | DIASTOLIC BLOOD PRESSURE: 64 MMHG

## 2025-02-27 DIAGNOSIS — Z34.90 PREGNANCY, UNSPECIFIED GESTATIONAL AGE: Primary | ICD-10-CM

## 2025-02-27 DIAGNOSIS — O24.414 INSULIN CONTROLLED GESTATIONAL DIABETES MELLITUS (GDM) DURING PREGNANCY, ANTEPARTUM: Primary | ICD-10-CM

## 2025-02-27 PROCEDURE — 99213 OFFICE O/P EST LOW 20 MIN: CPT | Performed by: OBSTETRICS & GYNECOLOGY

## 2025-02-27 PROCEDURE — 76815 OB US LIMITED FETUS(S): CPT | Performed by: OBSTETRICS & GYNECOLOGY

## 2025-02-27 PROCEDURE — 0502F SUBSEQUENT PRENATAL CARE: CPT | Performed by: OBSTETRICS & GYNECOLOGY

## 2025-02-27 PROCEDURE — 59025 FETAL NON-STRESS TEST: CPT | Performed by: OBSTETRICS & GYNECOLOGY

## 2025-02-27 NOTE — PROGRESS NOTES
Patient was seen 2/27/2025      Please look under media to view full consult and ultrasound report in ViewPoint.         Redd Silva MD  Maternal Fetal Medicine

## 2025-02-27 NOTE — PROCEDURES
PATIENT: SKYLER HOLMAN   -  : 1990   -  DOS:2025   -  INTERPRETING PROVIDER:Redd Silva,   Indication  ========    Obesity in pregnancy BMI 37.8, GDM    Method  ======    External Fetal Monitor and transabdominal ultrasound examination. View: Sufficient    Pregnancy  =========    Negro pregnancy. Number of fetuses: 1    Dating  ======    LMP on: 2024  Cycle: regular cycle  GA by LMP 34 w + 0 d  DEEPTI by LMP: 4/10/2025  Previous Ultrasound on: 9/10/2024  Type of prior assessment: GA  GA at prior assessment date 9 w + 5 d  GA by previous U/S 34 w + 0 d  DEEPTI by previous Ultrasound: 4/10/2025  Assigned: based on the LMP, selected on 2024  Assigned GA 34 w + 0 d  Assigned DEEPTI: 4/10/2025    General Evaluation  ==============    Cardiac activity present.  bpm. Fetal movements: visualized. Presentation: Cephalic  Placenta: Placental site: anterior, appropriate distance from the internal os  Umbilical cord: Cord vessels: 3 vessel cord    Fetal Anatomy  ===========    Stomach: normal  Kidneys: normal  Bladder: normal  Wants to know fetal sex: yes    Amniotic Fluid Assessment  =====================    Amount of AF: normal  MVP 6.2 cm. MARIN 13.9 cm. Q1 2.8 cm, Q2 6.2 cm, Q3 2.9 cm, Q4 1.9 cm    Non Stress Test  =============    NST interpretation: reactive. Test duration 20 min. Baseline  bpm. Baseline variability: moderate. Accelerations: present. Decelerations: absent. Uterine activity:  absent. Acoustic stimulation: no  Reactive NST    Findings  =======    Intrauterine Negro pregnancy at 34w 0d by clinical dates.  Amniotic fluid: normal.  Placenta is anterior, appropriate distance from the internal os.  Cephalic presentation.    NST is reactive. Modified BPP is normal.    The ultrasound findings as listed above and diagnostic limitations of ultrasound imaging, including inability to exclude all anomalies, have been reviewed with the patient. All  questions and concerns

## 2025-02-27 NOTE — PROGRESS NOTES
Rocky Lilly is 34w0d   Doing well  +FM  Denies LOF, bleeding/spotting, cramping, headache, blurred vision, edema, or RUQ pain.

## 2025-03-01 LAB
BACTERIA SPEC CULT: NORMAL
SERVICE CMNT-IMP: NORMAL

## 2025-03-03 LAB
BACTERIA SPEC CULT: NORMAL
SERVICE CMNT-IMP: NORMAL

## 2025-03-05 DIAGNOSIS — O24.419 GESTATIONAL DIABETES MELLITUS (GDM), ANTEPARTUM, GESTATIONAL DIABETES METHOD OF CONTROL UNSPECIFIED: Primary | ICD-10-CM

## 2025-03-06 ENCOUNTER — ROUTINE PRENATAL (OUTPATIENT)
Age: 35
End: 2025-03-06
Payer: MEDICAID

## 2025-03-06 VITALS — DIASTOLIC BLOOD PRESSURE: 68 MMHG | HEART RATE: 97 BPM | SYSTOLIC BLOOD PRESSURE: 103 MMHG

## 2025-03-06 DIAGNOSIS — O24.414 INSULIN CONTROLLED GESTATIONAL DIABETES MELLITUS (GDM) IN THIRD TRIMESTER: ICD-10-CM

## 2025-03-06 DIAGNOSIS — O34.219 HISTORY OF CESAREAN SECTION COMPLICATING PREGNANCY: ICD-10-CM

## 2025-03-06 DIAGNOSIS — O99.210 OBESITY AFFECTING PREGNANCY, ANTEPARTUM, UNSPECIFIED OBESITY TYPE: Primary | ICD-10-CM

## 2025-03-06 DIAGNOSIS — O99.210 OBESITY AFFECTING PREGNANCY, ANTEPARTUM, UNSPECIFIED OBESITY TYPE: ICD-10-CM

## 2025-03-06 DIAGNOSIS — O24.419 GESTATIONAL DIABETES MELLITUS (GDM), ANTEPARTUM, GESTATIONAL DIABETES METHOD OF CONTROL UNSPECIFIED: ICD-10-CM

## 2025-03-06 DIAGNOSIS — O24.414 INSULIN CONTROLLED GESTATIONAL DIABETES MELLITUS (GDM) DURING PREGNANCY, ANTEPARTUM: Primary | ICD-10-CM

## 2025-03-06 PROCEDURE — 99214 OFFICE O/P EST MOD 30 MIN: CPT

## 2025-03-06 PROCEDURE — 76818 FETAL BIOPHYS PROFILE W/NST: CPT | Performed by: STUDENT IN AN ORGANIZED HEALTH CARE EDUCATION/TRAINING PROGRAM

## 2025-03-06 NOTE — PROCEDURES
PATIENT: SKYLER HOLMAN   -  : 1990   -  DOS:2025   -  INTERPRETING PROVIDER:Kimi Espinosa,   Indication  ========    Obesity in pregnancy BMI 37.8, GDM insulin    Method  ======    External Fetal Monitor and transabdominal ultrasound examination. View: suboptimal due to advanced gestational age    Pregnancy  =========    Negro pregnancy. Number of fetuses: 1    Dating  ======    LMP on: 2024  Cycle: regular cycle  GA by LMP 35 w + 0 d  DEEPTI by LMP: 4/10/2025  Previous Ultrasound on: 9/10/2024  Type of prior assessment: GA  GA at prior assessment date 9 w + 5 d  GA by previous U/S 35 w + 0 d  DEEPTI by previous Ultrasound: 4/10/2025  Assigned: based on the LMP, selected on 2024  Assigned GA 35 w + 0 d  Assigned DEEPTI: 4/10/2025    General Evaluation  ==============    Cardiac activity present.  bpm. Fetal movements: visualized. Presentation: Cephalic  Placenta: Placental site: anterior, appropriate distance from the internal os  Umbilical cord: Cord vessels: 3 vessel cord    Fetal Anatomy  ===========    Stomach: normal  Kidneys: normal  Bladder: normal  Wants to know fetal sex: yes    Amniotic Fluid Assessment  =====================    Amount of AF: normal  MVP 5.5 cm. MARIN 17.2 cm. Q1 3.8 cm, Q2 5.5 cm, Q3 3.9 cm, Q4 4.0 cm    Biophysical Profile  ==============    2: Fetal breathing movements  2: Gross body movements  2: Fetal tone  2: Amniotic fluid volume  NST: reactive  10/10 Biophysical profile score    Non Stress Test  =============    NST interpretation: reactive. Test duration 20 min. Baseline  bpm. Baseline variability: moderate. Accelerations: present. Decelerations: absent. Uterine activity:  present, 1 contraction, patient tolerating    Findings  =======    Intrauterine Negro pregnancy at 35w 0d by clinical dates.  Amniotic fluid: normal.  Placenta is anterior, appropriate distance from the internal os.  Cephalic presentation.    Biophysical profile score is

## 2025-03-06 NOTE — PROGRESS NOTES
Patient was seen 3/6/2025      Please look under media to view full consult and ultrasound report in ViewPoint.         Kimi Espinosa MD   Maternal Fetal Medicine

## 2025-03-06 NOTE — PROGRESS NOTES
Nohemy Lilly (:  1990) is a 34 y.o. female,Established patient, here for evaluation of the following chief complaint(s):  1. Obesity affecting pregnancy, antepartum, unspecified obesity type  2. Insulin controlled gestational diabetes mellitus (GDM) in third trimester  3. History of  section complicating pregnancy      Objective   Physical Exam  Vitals reviewed.   Constitutional:       Appearance: Normal appearance.   Neurological:      Mental Status: She is alert.   Psychiatric:         Mood and Affect: Mood normal.         Judgment: Judgment normal.       On this date 3/6/2025 I have spent time reviewing previous notes, test results and discussing the diagnosis and importance of compliance with the treatment plan face to face with the patient as well as documenting on the day of the visit.  An electronic signature was used to authenticate this note.    --KRISTIN Arnold - CNP

## 2025-03-06 NOTE — PROGRESS NOTES
Patient presented her Home BG log.     Patient does not report signs or symptoms of pre-eclampsia or labor.  She reports counting fetal movements daily and has noted adequate fetal movement counts at home. Advised patient to continue monitoring fetal movements at home and to report any concerns for pre-eclampsia or labor to her OB. Patient verbalized understanding. All patient questions were addressed.

## 2025-03-13 ENCOUNTER — ROUTINE PRENATAL (OUTPATIENT)
Age: 35
End: 2025-03-13
Payer: MEDICAID

## 2025-03-13 ENCOUNTER — ROUTINE PRENATAL (OUTPATIENT)
Age: 35
End: 2025-03-13

## 2025-03-13 VITALS
HEIGHT: 61 IN | TEMPERATURE: 98 F | SYSTOLIC BLOOD PRESSURE: 122 MMHG | RESPIRATION RATE: 16 BRPM | HEART RATE: 97 BPM | OXYGEN SATURATION: 98 % | WEIGHT: 208 LBS | DIASTOLIC BLOOD PRESSURE: 75 MMHG | BODY MASS INDEX: 39.27 KG/M2

## 2025-03-13 VITALS — HEART RATE: 95 BPM | SYSTOLIC BLOOD PRESSURE: 120 MMHG | DIASTOLIC BLOOD PRESSURE: 73 MMHG

## 2025-03-13 DIAGNOSIS — O26.90 PREGNANCY COMPLICATION, ANTEPARTUM: ICD-10-CM

## 2025-03-13 DIAGNOSIS — Z34.90 PREGNANCY, UNSPECIFIED GESTATIONAL AGE: Primary | ICD-10-CM

## 2025-03-13 DIAGNOSIS — O99.210 OBESITY IN PREGNANCY, ANTEPARTUM: Primary | ICD-10-CM

## 2025-03-13 DIAGNOSIS — O24.419 GESTATIONAL DIABETES MELLITUS (GDM), ANTEPARTUM, GESTATIONAL DIABETES METHOD OF CONTROL UNSPECIFIED: ICD-10-CM

## 2025-03-13 PROCEDURE — 76818 FETAL BIOPHYS PROFILE W/NST: CPT | Performed by: STUDENT IN AN ORGANIZED HEALTH CARE EDUCATION/TRAINING PROGRAM

## 2025-03-13 PROCEDURE — 99214 OFFICE O/P EST MOD 30 MIN: CPT | Performed by: STUDENT IN AN ORGANIZED HEALTH CARE EDUCATION/TRAINING PROGRAM

## 2025-03-13 PROCEDURE — 0502F SUBSEQUENT PRENATAL CARE: CPT | Performed by: OBSTETRICS & GYNECOLOGY

## 2025-03-13 NOTE — PROGRESS NOTES
Patient here for return OB visit at 36w0d.       She reports good fetal movement.   She denies vaginal bleeding, loss of fluid, abnormal vaginal discharge, UTI symptoms, cramping, or contractions.       /75 (BP Site: Right Upper Arm, Patient Position: Sitting)   Pulse 97   Temp 98 °F (36.7 °C)   Resp 16   Ht 1.549 m (5' 1\")   Wt 94.3 kg (208 lb)   LMP 2024 (Exact Date)   SpO2 98%   BMI 39.30 kg/m²    Cervix:  FH:    Prenatal Fetal Information  Fetal HR: 135  Movement: Present      Patient Active Problem List    Diagnosis Date Noted    DM (diabetes mellitus), gestational 2024    Pregnancy 10/05/2024     Primary Provider: Cruz   CSx1  EDC by: US/LMP  Social: FOB Sam  IOB labs: Hep B neg.Hep C neg. RPR neg. HIV neg. Varicella/Rubella immune B NEGATIVE Hgb 12.3. normal platelet count. Hemoglobin electrophoresis-normal GIRL Sue  Genetic Screening:[place] low risk female Horizon SMA alpha thal carrier   Anatomy: NL with MFM  3rd TM labs: GTT early 1 hr GDM Hgb___ Plts___  Vaccines:   Flu:declines TDAP: 25 RSV ___ 32-36 weeks September-January   Rhogam: B NEGATIVE     GBS:     Postpartum  -Breast/pump  -Pap  -Pills?      Problem List:  1)Prior CSX1 (Dr. Haywood) delivered at 37 week [facility]  2)Hx of extensive myomectomy with Shabbir Bolivar   -planning repeat CS at 37 weeks   3) ABO:B NEGATIVE   -Rhogam given 25  4) A2GDM NPH 10 units qhs   -s/p DM ed   -MFM on     5) Alpha Thalassemia   - also carrier   -needs genetic counselor          delivery due to maternal disorder 2022    Fibroids 2021       Return for 7 week PPV and two weeks for incision check .    Vianey Arroyo MD

## 2025-03-13 NOTE — PROGRESS NOTES
Preghugh Lilly is 36w0d   Doing well  +FM  Denies LOF, bleeding/spotting, cramping, headache, blurred vision, edema, or RUQ pain.       Reports BPP at [facility] today was a 6/8 - follow up ultrasound scheduled for next week

## 2025-03-13 NOTE — PROGRESS NOTES
Patient was seen 3/13/2025      Please look under media to view full consult and ultrasound report in ViewPoint.         Kimi Espinosa MD   Maternal Fetal Medicine

## 2025-03-14 ENCOUNTER — TELEPHONE (OUTPATIENT)
Age: 35
End: 2025-03-14

## 2025-03-14 ENCOUNTER — ANESTHESIA (OUTPATIENT)
Facility: HOSPITAL | Age: 35
End: 2025-03-14
Payer: MEDICAID

## 2025-03-14 ENCOUNTER — ANESTHESIA EVENT (OUTPATIENT)
Facility: HOSPITAL | Age: 35
End: 2025-03-14
Payer: MEDICAID

## 2025-03-14 ENCOUNTER — HOSPITAL ENCOUNTER (INPATIENT)
Facility: HOSPITAL | Age: 35
LOS: 2 days | Discharge: HOME | DRG: 540 | End: 2025-03-16
Attending: OBSTETRICS & GYNECOLOGY | Admitting: OBSTETRICS & GYNECOLOGY
Payer: MEDICAID

## 2025-03-14 PROBLEM — Z3A.36 36 WEEKS GESTATION OF PREGNANCY: Status: ACTIVE | Noted: 2025-03-14

## 2025-03-14 LAB
ABO, EXTERNAL RESULT: NORMAL
AMNIOTEST, POC: NORMAL
ERYTHROCYTE [DISTWIDTH] IN BLOOD BY AUTOMATED COUNT: 16.6 % (ref 11.5–14.5)
GLUCOSE BLD STRIP.AUTO-MCNC: 73 MG/DL (ref 65–117)
GLUCOSE BLD STRIP.AUTO-MCNC: 88 MG/DL (ref 65–117)
HCT VFR BLD AUTO: 35.4 % (ref 35–47)
HGB BLD-MCNC: 11.3 G/DL (ref 11.5–16)
Lab: NORMAL
MCH RBC QN AUTO: 24.8 PG (ref 26–34)
MCHC RBC AUTO-ENTMCNC: 31.9 G/DL (ref 30–36.5)
MCV RBC AUTO: 77.6 FL (ref 80–99)
NEGATIVE QC PASS/FAIL: NORMAL
NRBC # BLD: 0 K/UL (ref 0–0.01)
NRBC BLD-RTO: 0 PER 100 WBC
PLATELET # BLD AUTO: 308 K/UL (ref 150–400)
PMV BLD AUTO: 10.2 FL (ref 8.9–12.9)
POSITIVE QC PASS/FAIL: NORMAL
RBC # BLD AUTO: 4.56 M/UL (ref 3.8–5.2)
RH FACTOR, EXTERNAL RESULT: NEGATIVE
RPR SER QL: NONREACTIVE
SERVICE CMNT-IMP: NORMAL
SERVICE CMNT-IMP: NORMAL
WBC # BLD AUTO: 9.3 K/UL (ref 3.6–11)

## 2025-03-14 PROCEDURE — 86921 COMPATIBILITY TEST INCUBATE: CPT

## 2025-03-14 PROCEDURE — 2580000003 HC RX 258

## 2025-03-14 PROCEDURE — 86870 RBC ANTIBODY IDENTIFICATION: CPT

## 2025-03-14 PROCEDURE — 6360000002 HC RX W HCPCS: Performed by: OBSTETRICS & GYNECOLOGY

## 2025-03-14 PROCEDURE — 2709999900 HC NON-CHARGEABLE SUPPLY: Performed by: OBSTETRICS & GYNECOLOGY

## 2025-03-14 PROCEDURE — 2500000003 HC RX 250 WO HCPCS: Performed by: OBSTETRICS & GYNECOLOGY

## 2025-03-14 PROCEDURE — 7100000000 HC PACU RECOVERY - FIRST 15 MIN: Performed by: OBSTETRICS & GYNECOLOGY

## 2025-03-14 PROCEDURE — 86901 BLOOD TYPING SEROLOGIC RH(D): CPT

## 2025-03-14 PROCEDURE — 3609079900 HC CESAREAN SECTION: Performed by: OBSTETRICS & GYNECOLOGY

## 2025-03-14 PROCEDURE — 4500000002 HC ER NO CHARGE

## 2025-03-14 PROCEDURE — 1120000000 HC RM PRIVATE OB

## 2025-03-14 PROCEDURE — 3700000000 HC ANESTHESIA ATTENDED CARE: Performed by: OBSTETRICS & GYNECOLOGY

## 2025-03-14 PROCEDURE — 6370000000 HC RX 637 (ALT 250 FOR IP): Performed by: OBSTETRICS & GYNECOLOGY

## 2025-03-14 PROCEDURE — 88307 TISSUE EXAM BY PATHOLOGIST: CPT

## 2025-03-14 PROCEDURE — 86900 BLOOD TYPING SEROLOGIC ABO: CPT

## 2025-03-14 PROCEDURE — 86850 RBC ANTIBODY SCREEN: CPT

## 2025-03-14 PROCEDURE — 6360000002 HC RX W HCPCS

## 2025-03-14 PROCEDURE — 2580000003 HC RX 258: Performed by: OBSTETRICS & GYNECOLOGY

## 2025-03-14 PROCEDURE — 86922 COMPATIBILITY TEST ANTIGLOB: CPT

## 2025-03-14 PROCEDURE — 3700000001 HC ADD 15 MINUTES (ANESTHESIA): Performed by: OBSTETRICS & GYNECOLOGY

## 2025-03-14 PROCEDURE — 82962 GLUCOSE BLOOD TEST: CPT

## 2025-03-14 PROCEDURE — 85027 COMPLETE CBC AUTOMATED: CPT

## 2025-03-14 PROCEDURE — 86920 COMPATIBILITY TEST SPIN: CPT

## 2025-03-14 PROCEDURE — 86592 SYPHILIS TEST NON-TREP QUAL: CPT

## 2025-03-14 PROCEDURE — 7100000001 HC PACU RECOVERY - ADDTL 15 MIN: Performed by: OBSTETRICS & GYNECOLOGY

## 2025-03-14 PROCEDURE — 59510 CESAREAN DELIVERY: CPT | Performed by: OBSTETRICS & GYNECOLOGY

## 2025-03-14 PROCEDURE — 99283 EMERGENCY DEPT VISIT LOW MDM: CPT

## 2025-03-14 PROCEDURE — 6360000002 HC RX W HCPCS: Performed by: ANESTHESIOLOGY

## 2025-03-14 RX ORDER — ONDANSETRON 4 MG/1
4 TABLET, ORALLY DISINTEGRATING ORAL EVERY 8 HOURS PRN
Status: DISCONTINUED | OUTPATIENT
Start: 2025-03-14 | End: 2025-03-16 | Stop reason: HOSPADM

## 2025-03-14 RX ORDER — MORPHINE SULFATE 1 MG/ML
INJECTION, SOLUTION EPIDURAL; INTRATHECAL; INTRAVENOUS
Status: COMPLETED | OUTPATIENT
Start: 2025-03-14 | End: 2025-03-14

## 2025-03-14 RX ORDER — ACETAMINOPHEN 325 MG/1
975 TABLET ORAL ONCE
Status: COMPLETED | OUTPATIENT
Start: 2025-03-14 | End: 2025-03-14

## 2025-03-14 RX ORDER — SODIUM CHLORIDE 9 MG/ML
INJECTION, SOLUTION INTRAVENOUS PRN
Status: DISCONTINUED | OUTPATIENT
Start: 2025-03-14 | End: 2025-03-16 | Stop reason: HOSPADM

## 2025-03-14 RX ORDER — KETOROLAC TROMETHAMINE 30 MG/ML
30 INJECTION, SOLUTION INTRAMUSCULAR; INTRAVENOUS EVERY 6 HOURS
Status: COMPLETED | OUTPATIENT
Start: 2025-03-14 | End: 2025-03-15

## 2025-03-14 RX ORDER — SODIUM CHLORIDE, SODIUM LACTATE, POTASSIUM CHLORIDE, CALCIUM CHLORIDE 600; 310; 30; 20 MG/100ML; MG/100ML; MG/100ML; MG/100ML
INJECTION, SOLUTION INTRAVENOUS CONTINUOUS
Status: DISPENSED | OUTPATIENT
Start: 2025-03-14 | End: 2025-03-15

## 2025-03-14 RX ORDER — OXYCODONE HYDROCHLORIDE 5 MG/1
5 TABLET ORAL EVERY 4 HOURS PRN
Refills: 0 | Status: DISCONTINUED | OUTPATIENT
Start: 2025-03-15 | End: 2025-03-16 | Stop reason: HOSPADM

## 2025-03-14 RX ORDER — MODIFIED LANOLIN
OINTMENT (GRAM) TOPICAL
Status: DISCONTINUED | OUTPATIENT
Start: 2025-03-14 | End: 2025-03-16 | Stop reason: HOSPADM

## 2025-03-14 RX ORDER — SIMETHICONE 80 MG
80 TABLET,CHEWABLE ORAL EVERY 6 HOURS PRN
Status: DISCONTINUED | OUTPATIENT
Start: 2025-03-14 | End: 2025-03-16 | Stop reason: HOSPADM

## 2025-03-14 RX ORDER — POLYETHYLENE GLYCOL 3350 17 G/17G
17 POWDER, FOR SOLUTION ORAL DAILY PRN
Status: DISCONTINUED | OUTPATIENT
Start: 2025-03-14 | End: 2025-03-16 | Stop reason: HOSPADM

## 2025-03-14 RX ORDER — MORPHINE SULFATE 1 MG/ML
2 INJECTION, SOLUTION EPIDURAL; INTRATHECAL; INTRAVENOUS
Refills: 0 | Status: DISCONTINUED | OUTPATIENT
Start: 2025-03-14 | End: 2025-03-16 | Stop reason: HOSPADM

## 2025-03-14 RX ORDER — OXYCODONE HYDROCHLORIDE 5 MG/1
10 TABLET ORAL EVERY 4 HOURS PRN
Refills: 0 | Status: DISCONTINUED | OUTPATIENT
Start: 2025-03-15 | End: 2025-03-16 | Stop reason: HOSPADM

## 2025-03-14 RX ORDER — SODIUM CHLORIDE, SODIUM LACTATE, POTASSIUM CHLORIDE, AND CALCIUM CHLORIDE .6; .31; .03; .02 G/100ML; G/100ML; G/100ML; G/100ML
1000 INJECTION, SOLUTION INTRAVENOUS ONCE
Status: COMPLETED | OUTPATIENT
Start: 2025-03-14 | End: 2025-03-14

## 2025-03-14 RX ORDER — DOCUSATE SODIUM 100 MG/1
100 CAPSULE, LIQUID FILLED ORAL 2 TIMES DAILY PRN
Status: DISCONTINUED | OUTPATIENT
Start: 2025-03-14 | End: 2025-03-16 | Stop reason: HOSPADM

## 2025-03-14 RX ORDER — KETOROLAC TROMETHAMINE 30 MG/ML
INJECTION, SOLUTION INTRAMUSCULAR; INTRAVENOUS
Status: DISCONTINUED | OUTPATIENT
Start: 2025-03-14 | End: 2025-03-14 | Stop reason: SDUPTHER

## 2025-03-14 RX ORDER — ONDANSETRON 2 MG/ML
4 INJECTION INTRAMUSCULAR; INTRAVENOUS EVERY 6 HOURS PRN
Status: DISCONTINUED | OUTPATIENT
Start: 2025-03-14 | End: 2025-03-14 | Stop reason: SDUPTHER

## 2025-03-14 RX ORDER — BUPIVACAINE HYDROCHLORIDE 7.5 MG/ML
INJECTION, SOLUTION INTRASPINAL
Status: COMPLETED | OUTPATIENT
Start: 2025-03-14 | End: 2025-03-14

## 2025-03-14 RX ORDER — SODIUM CHLORIDE 0.9 % (FLUSH) 0.9 %
5-40 SYRINGE (ML) INJECTION EVERY 12 HOURS SCHEDULED
Status: DISCONTINUED | OUTPATIENT
Start: 2025-03-14 | End: 2025-03-16 | Stop reason: HOSPADM

## 2025-03-14 RX ORDER — MORPHINE SULFATE 1 MG/ML
4 INJECTION, SOLUTION EPIDURAL; INTRATHECAL; INTRAVENOUS
Refills: 0 | Status: DISCONTINUED | OUTPATIENT
Start: 2025-03-14 | End: 2025-03-16 | Stop reason: HOSPADM

## 2025-03-14 RX ORDER — IBUPROFEN 400 MG/1
800 TABLET, FILM COATED ORAL EVERY 8 HOURS
Status: DISCONTINUED | OUTPATIENT
Start: 2025-03-15 | End: 2025-03-16 | Stop reason: HOSPADM

## 2025-03-14 RX ORDER — MISOPROSTOL 200 UG/1
800 TABLET ORAL PRN
Status: DISCONTINUED | OUTPATIENT
Start: 2025-03-14 | End: 2025-03-16 | Stop reason: HOSPADM

## 2025-03-14 RX ORDER — SODIUM CHLORIDE 0.9 % (FLUSH) 0.9 %
5-40 SYRINGE (ML) INJECTION PRN
Status: DISCONTINUED | OUTPATIENT
Start: 2025-03-14 | End: 2025-03-16 | Stop reason: HOSPADM

## 2025-03-14 RX ORDER — ONDANSETRON 2 MG/ML
INJECTION INTRAMUSCULAR; INTRAVENOUS
Status: DISCONTINUED | OUTPATIENT
Start: 2025-03-14 | End: 2025-03-14 | Stop reason: SDUPTHER

## 2025-03-14 RX ORDER — ACETAMINOPHEN 500 MG
1000 TABLET ORAL EVERY 8 HOURS SCHEDULED
Status: DISCONTINUED | OUTPATIENT
Start: 2025-03-14 | End: 2025-03-16 | Stop reason: HOSPADM

## 2025-03-14 RX ORDER — SODIUM CHLORIDE, SODIUM LACTATE, POTASSIUM CHLORIDE, CALCIUM CHLORIDE 600; 310; 30; 20 MG/100ML; MG/100ML; MG/100ML; MG/100ML
INJECTION, SOLUTION INTRAVENOUS CONTINUOUS
Status: DISCONTINUED | OUTPATIENT
Start: 2025-03-14 | End: 2025-03-14 | Stop reason: SDUPTHER

## 2025-03-14 RX ORDER — DEXAMETHASONE SODIUM PHOSPHATE 4 MG/ML
INJECTION, SOLUTION INTRA-ARTICULAR; INTRALESIONAL; INTRAMUSCULAR; INTRAVENOUS; SOFT TISSUE
Status: DISCONTINUED | OUTPATIENT
Start: 2025-03-14 | End: 2025-03-14 | Stop reason: SDUPTHER

## 2025-03-14 RX ORDER — SODIUM CHLORIDE 0.9 % (FLUSH) 0.9 %
10 SYRINGE (ML) INJECTION PRN
Status: DISCONTINUED | OUTPATIENT
Start: 2025-03-14 | End: 2025-03-16 | Stop reason: HOSPADM

## 2025-03-14 RX ORDER — ONDANSETRON 2 MG/ML
4 INJECTION INTRAMUSCULAR; INTRAVENOUS EVERY 6 HOURS PRN
Status: DISCONTINUED | OUTPATIENT
Start: 2025-03-14 | End: 2025-03-16 | Stop reason: HOSPADM

## 2025-03-14 RX ADMIN — Medication 909 ML/HR: at 11:06

## 2025-03-14 RX ADMIN — KETOROLAC TROMETHAMINE 30 MG: 30 INJECTION, SOLUTION INTRAMUSCULAR at 17:33

## 2025-03-14 RX ADMIN — WATER 2000 MG: 1 INJECTION INTRAMUSCULAR; INTRAVENOUS; SUBCUTANEOUS at 10:02

## 2025-03-14 RX ADMIN — PHENYLEPHRINE HYDROCHLORIDE 30 MCG/MIN: 10 INJECTION INTRAVENOUS at 10:15

## 2025-03-14 RX ADMIN — BUPIVACAINE HYDROCHLORIDE WITH DEXTROSE 11 MG: 7.5 INJECTION SUBARACHNOID at 10:14

## 2025-03-14 RX ADMIN — ONDANSETRON 4 MG: 2 INJECTION, SOLUTION INTRAMUSCULAR; INTRAVENOUS at 11:32

## 2025-03-14 RX ADMIN — FAMOTIDINE 20 MG: 10 INJECTION, SOLUTION INTRAVENOUS at 09:59

## 2025-03-14 RX ADMIN — KETOROLAC TROMETHAMINE 30 MG: 30 INJECTION, SOLUTION INTRAMUSCULAR at 11:58

## 2025-03-14 RX ADMIN — ONDANSETRON 4 MG: 2 INJECTION, SOLUTION INTRAMUSCULAR; INTRAVENOUS at 10:09

## 2025-03-14 RX ADMIN — SODIUM CHLORIDE, SODIUM LACTATE, POTASSIUM CHLORIDE, AND CALCIUM CHLORIDE: .6; .31; .03; .02 INJECTION, SOLUTION INTRAVENOUS at 13:00

## 2025-03-14 RX ADMIN — KETOROLAC TROMETHAMINE 30 MG: 30 INJECTION, SOLUTION INTRAMUSCULAR at 23:23

## 2025-03-14 RX ADMIN — BUPIVACAINE HYDROCHLORIDE 11 MG: 7.5 INJECTION, SOLUTION INTRASPINAL at 10:38

## 2025-03-14 RX ADMIN — DEXAMETHASONE SODIUM PHOSPHATE 4 MG: 4 INJECTION INTRA-ARTICULAR; INTRALESIONAL; INTRAMUSCULAR; INTRAVENOUS; SOFT TISSUE at 11:27

## 2025-03-14 RX ADMIN — MORPHINE SULFATE 0.2 MG: 1 INJECTION EPIDURAL; INTRATHECAL; INTRAVENOUS at 10:14

## 2025-03-14 RX ADMIN — ACETAMINOPHEN 1000 MG: 500 TABLET ORAL at 18:45

## 2025-03-14 RX ADMIN — ACETAMINOPHEN 975 MG: 325 TABLET ORAL at 10:00

## 2025-03-14 RX ADMIN — SODIUM CHLORIDE, SODIUM LACTATE, POTASSIUM CHLORIDE, AND CALCIUM CHLORIDE 1000 ML: .6; .31; .03; .02 INJECTION, SOLUTION INTRAVENOUS at 09:58

## 2025-03-14 RX ADMIN — AZITHROMYCIN MONOHYDRATE 500 MG: 500 INJECTION, POWDER, LYOPHILIZED, FOR SOLUTION INTRAVENOUS at 10:07

## 2025-03-14 RX ADMIN — SODIUM CHLORIDE, POTASSIUM CHLORIDE, SODIUM LACTATE AND CALCIUM CHLORIDE: 600; 310; 30; 20 INJECTION, SOLUTION INTRAVENOUS at 10:49

## 2025-03-14 ASSESSMENT — PAIN SCALES - GENERAL
PAINLEVEL_OUTOF10: 3
PAINLEVEL_OUTOF10: 0
PAINLEVEL_OUTOF10: 6

## 2025-03-14 ASSESSMENT — PAIN DESCRIPTION - DESCRIPTORS
DESCRIPTORS: SORE
DESCRIPTORS: SORE

## 2025-03-14 ASSESSMENT — PAIN DESCRIPTION - LOCATION
LOCATION: INCISION
LOCATION: ABDOMEN;INCISION

## 2025-03-14 ASSESSMENT — PAIN DESCRIPTION - ORIENTATION
ORIENTATION: LOWER;MID
ORIENTATION: LOWER

## 2025-03-14 ASSESSMENT — PAIN - FUNCTIONAL ASSESSMENT: PAIN_FUNCTIONAL_ASSESSMENT: ACTIVITIES ARE NOT PREVENTED

## 2025-03-14 NOTE — ANESTHESIA PROCEDURE NOTES
Spinal Block    Patient location during procedure: OR  End time: 3/14/2025 10:15 AM  Reason for block: primary anesthetic  Staffing  Performed: anesthesiologist   Anesthesiologist: Buddy Calles Jr., MD  Performed by: Buddy Calles Jr., MD  Authorized by: Buddy Calles Jr., MD    Spinal Block  Patient position: sitting  Prep: DuraPrep  Patient monitoring: cardiac monitor, continuous pulse ox, frequent blood pressure checks and oxygen  Approach: midline  Location: L4/L5  Provider prep: mask and sterile gloves  Needle  Needle type: pencil-tip   Needle gauge: 25 G  Needle length: 4 in  Assessment  Sensory level: T4  Swirl obtained: Yes  CSF: clear  Attempts: 1  Hemodynamics: stable  Preanesthetic Checklist  Completed: patient identified, IV checked, site marked, risks and benefits discussed, surgical/procedural consents, equipment checked, pre-op evaluation, timeout performed, anesthesia consent given, oxygen available, monitors applied/VS acknowledged and fire risk safety assessment completed and verbalized

## 2025-03-14 NOTE — TELEPHONE ENCOUNTER
Attempted to contact patient via telephone in regards to recent MyChart message. Voicemail left. MyChart message sent.

## 2025-03-14 NOTE — ANESTHESIA PROCEDURE NOTES
Spinal Block    Patient location during procedure: OR  End time: 3/14/2025 10:38 AM  Reason for block: primary anesthetic  Staffing  Performed: anesthesiologist   Anesthesiologist: Buddy Calles Jr., MD  Performed by: Buddy Calles Jr., MD  Authorized by: Buddy Calles Jr., MD    Spinal Block  Patient position: sitting  Prep: DuraPrep  Patient monitoring: cardiac monitor, continuous pulse ox, frequent blood pressure checks and oxygen  Approach: midline  Location: L4/L5  Provider prep: mask and sterile gloves  Needle  Needle type: pencil-tip   Needle gauge: 25 G  Needle length: 4 in  Assessment  Events: failed spinal  Swirl obtained: Yes  CSF: clear  Attempts: 1  Hemodynamics: stable  Additional Notes  Pt minimal blk, elected to repeat  Preanesthetic Checklist  Completed: patient identified, IV checked, site marked, risks and benefits discussed, surgical/procedural consents, equipment checked, pre-op evaluation, timeout performed, anesthesia consent given, oxygen available, monitors applied/VS acknowledged and fire risk safety assessment completed and verbalized

## 2025-03-14 NOTE — ANESTHESIA PRE PROCEDURE
Department of Anesthesiology  Preprocedure Note       Name:  Rocky Lilly   Age:  34 y.o.  :  1990                                          MRN:  [id number]         Date:  3/14/2025      Surgeon: Surgeon(s):  Vianey Arroyo MD Peng, Katherine, MD    Procedure: Procedure(s):   SECTION (E R A S)    Medications prior to admission:   Prior to Admission medications    Medication Sig Start Date End Date Taking? Authorizing Provider   Insulin Pen Needle 32G X 6 MM MISC 1 Stick by Other route 4 times daily Indications: Diabetes During Pregnancy Please use to administer insulin daily. 25   Kimi Espinosa MD   blood glucose monitor strips Test 4 times a day & as needed for symptoms of irregular blood glucose. Dispense sufficient amount for indicated testing frequency plus additional to accommodate PRN testing needs. 25   Vianey Arroyo MD   ferrous sulfate (FE TABS 325) 325 (65 Fe) MG EC tablet Take 1 tablet by mouth daily (with breakfast) 25   Vianey Arroyo MD   Alcohol Swabs (ALCOHOL PREP) 70 % PADS Use to clean skin when checking blood sugar 25   Kimi Espinosa MD   insulin NPH (HUMULIN N KWIKPEN) 100 UNIT/ML injection pen Inject 10 Units into the skin nightly Please inject 10 units nightly. Please substitute for insurance preferred brand 25   Kimi Espinosa MD   calcium carbonate (TUMS) 500 MG chewable tablet Take 1 tablet by mouth daily    Provider, MD Christian   Lancets MISC 1 each by Does not apply route daily 25   Vianey Arroyo MD   aspirin (ASPIRIN 81) 81 MG EC tablet Take 1 tablet by mouth daily Start at 12 weeks 24   Vianey Arroyo MD   blood glucose monitor kit and supplies Dispense sufficient amount for indicated testing frequency plus additional to accommodate PRN testing needs. Dispense all needed supplies to include: monitor, strips, lancing device, lancets, control solutions, alcohol swabs. 10/31/24   Vianey Arroyo MD

## 2025-03-14 NOTE — H&P
History & Physical    Name: Rocky Lilly MRN: [id number]  SSN: [id number]    YOB: 1990  Age: 34 y.o.  Sex: female        Subjective:     Estimated Date of Delivery: 4/10/25  OB History          4    Para   1    Term   1            AB   2    Living   1         SAB   1    IAB   1    Ectopic        Molar        Multiple        Live Births   1                Ms. Lilly is admitted with pregnancy at 36w1d for  SROM (clear) at 0700 AM . This was proceeded was cramping. Starting to feel more contractions now.  Prenatal course was complicated by  prior  section and history of myomectomy . Please see prenatal records for details.    Patient Active Problem List    Diagnosis Date Noted    36 weeks gestation of pregnancy 2025    DM (diabetes mellitus), gestational 2024    Pregnancy 10/05/2024     Primary Provider: Cruz   CSx1  EDC by: US/LMP  Social: FOB Sam  IOB labs: Hep B neg.Hep C neg. RPR neg. HIV neg. Varicella/Rubella immune B NEGATIVE Hgb 12.3. normal platelet count. Hemoglobin electrophoresis-normal GIRL Sue  Genetic Screening:[place] low risk female Horizon SMA alpha thal carrier   Anatomy: NL with MFM  3rd TM labs: GTT early 1 hr GDM Hgb:10.4 Plts 308  Vaccines:   Flu:declines TDAP: 25 RSV NA based on DEEPTI 32-36 weeks September-January   Rhogam: B NEGATIVE     GBS:     Postpartum  -Breast/pump  -Pap  -Pills?      Problem List:  1)Prior CSX1 (Dr. Haywood) delivered at 37 week [facility]  2)Hx of extensive myomectomy with Shabbir Bolivar   -planning repeat CS at 37 weeks   3) ABO:B NEGATIVE   -Rhogam given 25  4) A2GDM NPH 10 units qhs   -s/p DM ed   -MFM on   5) Alpha Thalassemia   - also carrier   -needs genetic counselor          delivery due to maternal disorder 2022    Fibroids 2021       Past Medical History:   Diagnosis Date    Asthma     Diabetes mellitus (HCC)     Fibroids     Gallstones     History of

## 2025-03-14 NOTE — LACTATION NOTE
This note was copied from a baby's chart.  Infant born via repeat C/S this morning to a  mom at 36 1/7 weeks gestation. Mom nursed her first child with adequate milk supply and  him until she was pregnant with this baby. Mom has gestational diabetes and initial blood sugar was 60. Mom states infant latched well following birth and nursed on both breasts.Educated mom on normal  behaviors and feeding patterns.  Encouraged mom to call for assistance as needed with feedings.

## 2025-03-14 NOTE — ED NOTES
35 yo  @ 36w1d who presents with leaking fluid, happened around 7 AM, feeling some crampy pain now but not strong contractions    Primary Provider: Cruz   CSx1  EDC by: US/LMP  Social: FOB Sam  IOB labs: Hep B neg.Hep C neg. RPR neg. HIV neg. Varicella/Rubella immune B NEGATIVE Hgb 12.3. normal platelet count. Hemoglobin electrophoresis-normal GIRL Sue  Genetic Screening:[place] low risk female Horizon SMA alpha thal carrier   Anatomy: NL with MFM  3rd TM labs: GTT early 1 hr GDM Hgb___ Plts___  Vaccines:   Flu:declines TDAP: 25 RSV ___ 32-36 weeks September-January   Rhogam: B NEGATIVE     GBS:     Postpartum  -Breast/pump  -Pap  -Pills?        Problem List:  1)Prior CSX1 (Dr. Haywood) delivered at 37 week [facility]  2)Hx of extensive myomectomy with Shabbir Bolivar   -planning repeat CS at 37 weeks   3) ABO:B NEGATIVE   -Rhogam given 25  4) A2GDM NPH 10 units qhs   -s/p DM ed   -MFM on     5) Alpha Thalassemia   - also carrier   -needs genetic counselor         The history is provided by the patient.        Chief Complaint   Patient presents with    Rupture of Membranes       Past Medical History:   Diagnosis Date    Asthma     Diabetes mellitus (HCC)     Fibroids     Gallstones     History of seasonal allergies     Prediabetes        Past Surgical History:   Procedure Laterality Date     SECTION  2022    CHOLECYSTECTOMY      HEENT      WISDOM TEETH    MYOMECTOMY Bilateral          Family History   Problem Relation Age of Onset    Cancer Maternal Aunt         BREAST CA    Cancer Maternal Grandmother         BREAST CA    Hypertension Maternal Grandmother     Diabetes Maternal Grandmother     Breast Cancer Maternal Grandmother     Cancer Father         CA NECK    Asthma Sister     Hypertension Sister     Stroke Neg Hx     Malig Hypertherm Neg Hx     Pseudochol. Deficiency Neg Hx     Delayed Awakening Neg Hx     Post-op Nausea/Vomiting Neg Hx     Heart Disease

## 2025-03-14 NOTE — ED TRIAGE NOTES
0844 Patient arrived to [facility] for r/o SROM. States she started cramping this morning and shortly after felt a gush of fluid at 0700. Nitrazine positive. EFM applied, vitals and history obtained. Patient would need a repeat c/s and was scheduled next week.     0900 Dr Moore notified of patient.     0915 Dr Moore at bedside.     0921 Transferred to L&D 12 for c/s    1005 in OR    1104 baby NILAM Lilly born via ltcs, APGARs 7/9.    1134 TNN assumes care of baby NILAM Lilly, SBAR report given.    1210 Out of OR    1230 Bedside shift change report given to DANILO Nj RN (oncoming nurse) by DANILO Miramontes RN (offgoing nurse). Report included the following information Nurse Handoff Report, Adult Overview, Surgery Report, Intake/Output, and MAR.

## 2025-03-14 NOTE — PROGRESS NOTES
TRANSFER - OUT REPORT:    Verbal report given to Kaye LOVELL on Rocky Lilly  being transferred to [facility] for routine progression of patient care       Report consisted of patient's Situation, Background, Assessment and   Recommendations(SBAR).     Information from the following report(s) Nurse Handoff Report, Surgery Report, Intake/Output, MAR, and Recent Results was reviewed with the receiving nurse.           Lines:   Peripheral IV 03/14/25 Posterior;Right Forearm (Active)   Site Assessment Clean, dry & intact 03/14/25 1550   Line Status Infusing 03/14/25 1550   Phlebitis Assessment No symptoms 03/14/25 1550   Infiltration Assessment 0 03/14/25 1550   Dressing Status Clean, dry & intact 03/14/25 1550   Dressing Type Transparent 03/14/25 1550        Opportunity for questions and clarification was provided.      Patient transported with:  Registered Nurse  Via bed

## 2025-03-14 NOTE — ANESTHESIA POSTPROCEDURE EVALUATION
Department of Anesthesiology  Postprocedure Note    Patient: Rocky Lilly  MRN: [id number]  YOB: 1990  Date of evaluation: 3/14/2025    Procedure Summary       Date: 25 Room / Location: [facility] L&D 01 / [facility] L&D OR    Anesthesia Start: 1006 Anesthesia Stop: 1215    Procedure:  SECTION (E R A S) (Abdomen) Diagnosis:       Delivery by  section using transverse incision of lower segment of uterus      (Delivery by  section using transverse incision of lower segment of uterus [O82])    Surgeons: Vianey Arroyo MD Responsible Provider: Buddy Calles Jr., MD    Anesthesia Type: Spinal ASA Status: 2            Anesthesia Type: Spinal    Jared Phase I:      Jared Phase II:      Anesthesia Post Evaluation    Patient location during evaluation: PACU  Patient participation: complete - patient participated  Level of consciousness: awake  Airway patency: patent  Nausea & Vomiting: no nausea  Cardiovascular status: blood pressure returned to baseline and hemodynamically stable  Respiratory status: acceptable  Hydration status: stable  Multimodal analgesia pain management approach    No notable events documented.

## 2025-03-15 LAB
ERYTHROCYTE [DISTWIDTH] IN BLOOD BY AUTOMATED COUNT: 16.4 % (ref 11.5–14.5)
HCT VFR BLD AUTO: 25.9 % (ref 35–47)
HGB BLD-MCNC: 8.1 G/DL (ref 11.5–16)
MCH RBC QN AUTO: 24.7 PG (ref 26–34)
MCHC RBC AUTO-ENTMCNC: 31.3 G/DL (ref 30–36.5)
MCV RBC AUTO: 79 FL (ref 80–99)
NRBC # BLD: 0 K/UL (ref 0–0.01)
NRBC BLD-RTO: 0 PER 100 WBC
PLATELET # BLD AUTO: 265 K/UL (ref 150–400)
PMV BLD AUTO: 10.6 FL (ref 8.9–12.9)
RBC # BLD AUTO: 3.28 M/UL (ref 3.8–5.2)
WBC # BLD AUTO: 12.8 K/UL (ref 3.6–11)

## 2025-03-15 PROCEDURE — 85027 COMPLETE CBC AUTOMATED: CPT

## 2025-03-15 PROCEDURE — 6370000000 HC RX 637 (ALT 250 FOR IP): Performed by: OBSTETRICS & GYNECOLOGY

## 2025-03-15 PROCEDURE — 1120000000 HC RM PRIVATE OB

## 2025-03-15 PROCEDURE — 86900 BLOOD TYPING SEROLOGIC ABO: CPT

## 2025-03-15 PROCEDURE — APPNB15 APP NON BILLABLE TIME 0-15 MINS: Performed by: ADVANCED PRACTICE MIDWIFE

## 2025-03-15 PROCEDURE — 85461 HEMOGLOBIN FETAL: CPT

## 2025-03-15 PROCEDURE — 96372 THER/PROPH/DIAG INJ SC/IM: CPT

## 2025-03-15 PROCEDURE — 6360000002 HC RX W HCPCS: Performed by: OBSTETRICS & GYNECOLOGY

## 2025-03-15 PROCEDURE — 86901 BLOOD TYPING SEROLOGIC RH(D): CPT

## 2025-03-15 RX ADMIN — IBUPROFEN 800 MG: 400 TABLET, FILM COATED ORAL at 11:49

## 2025-03-15 RX ADMIN — IBUPROFEN 800 MG: 400 TABLET, FILM COATED ORAL at 19:45

## 2025-03-15 RX ADMIN — HUMAN RHO(D) IMMUNE GLOBULIN 300 MCG: 300 INJECTION, SOLUTION INTRAMUSCULAR at 18:29

## 2025-03-15 RX ADMIN — ACETAMINOPHEN 1000 MG: 500 TABLET ORAL at 19:45

## 2025-03-15 RX ADMIN — DOCUSATE SODIUM 100 MG: 100 CAPSULE, LIQUID FILLED ORAL at 11:49

## 2025-03-15 RX ADMIN — ACETAMINOPHEN 1000 MG: 500 TABLET ORAL at 11:49

## 2025-03-15 RX ADMIN — ACETAMINOPHEN 1000 MG: 500 TABLET ORAL at 02:52

## 2025-03-15 RX ADMIN — KETOROLAC TROMETHAMINE 30 MG: 30 INJECTION, SOLUTION INTRAMUSCULAR at 05:43

## 2025-03-15 ASSESSMENT — PAIN DESCRIPTION - LOCATION
LOCATION: ABDOMEN;INCISION
LOCATION: INCISION

## 2025-03-15 ASSESSMENT — PAIN DESCRIPTION - DESCRIPTORS
DESCRIPTORS: SORE
DESCRIPTORS: SORE

## 2025-03-15 ASSESSMENT — PAIN DESCRIPTION - ORIENTATION
ORIENTATION: LOWER;MID
ORIENTATION: LOWER;MID

## 2025-03-15 ASSESSMENT — PAIN - FUNCTIONAL ASSESSMENT
PAIN_FUNCTIONAL_ASSESSMENT: ACTIVITIES ARE NOT PREVENTED
PAIN_FUNCTIONAL_ASSESSMENT: ACTIVITIES ARE NOT PREVENTED

## 2025-03-15 ASSESSMENT — PAIN SCALES - GENERAL
PAINLEVEL_OUTOF10: 3
PAINLEVEL_OUTOF10: 3

## 2025-03-15 NOTE — PROGRESS NOTES
Bedside shift change report given to JUAN Maldonado RN (oncoming nurse) by JR Lanza RN (offgoing nurse). Report included the following information Nurse Handoff Report.

## 2025-03-15 NOTE — OP NOTE
Operative Note      Patient: Rocky Lilly  YOB: 1990  MRN: [id number]    Date of Procedure: 3/14/2025      Pre-Op Diagnosis:  1)  at 36w1d   2) Prior  section x1  3) History of myomectomy     Pre-Op Diagnosis Codes:   1)  at 36w1d   2) Prior  section x1  3) History of myomectomy     Post-Op Diagnosis: Same       Procedure(s):  REPEAT LOW TRANSVERSE  SECTION (E R A S)    Surgeon(s):  Vianey Arroyo MD    Assistant:   First Assistant: Esthela Mcduffie RN    Anesthesia: Epidural    Estimated Blood Loss (mL): 500    Complications: None    Specimens:   ID Type Source Tests Collected by Time Destination   A : 36w1d gestation placenta, PROM. Hx of myomectomy and GDM Tissue Placenta SURGICAL PATHOLOGY Vianey Arroyo MD 3/14/2025 1133        Implants:  * No implants in log *      Drains:   Urinary Catheter 25 [place] (Active)   Catheter Indications Perioperative use for selected surgical procedures 25   Site Assessment No urethral drainage 25 1229   Urine Color Yellow 25   Urine Appearance Clear 25   Collection Container Standard 25   Securement Method Securing device (Describe) 25   Catheter Care  Perineal wipes 25   Catheter Best Practices  Drainage tube clipped to bed;Catheter secured to thigh;Tamper seal intact;Bag below bladder;Bag not on floor;Lack of dependent loop in tubing;Drainage bag less than half full 25   Status Draining;Patent 25   Output (mL) 300 mL 25       Findings:  Infection Present At Time Of Surgery (PATOS) (choose all levels that have infection present):  No infection present  Other Findings: multiple small ,<2 cm, leiomyomas throughout the uterus. Defect in the myometrium, fundal left, from previous myomectomy. Liveborn female . Clean amniotic fluid. Thin lower uterine segment. Grossly normal placenta.

## 2025-03-15 NOTE — PROGRESS NOTES
Bedside and Verbal shift change report given to SHANIA Lanza RN (oncoming nurse) by HAMILTON Worley RN (offgoing nurse). Report included the following information Nurse Handoff Report and Surgery Report.

## 2025-03-15 NOTE — PROGRESS NOTES
Post-Operative  Day 1    Rocky Lilly     Assessment:   Post-Op day 1, stable          Plan:   1. Routine post-operative care   2. N/A     Information for the patient's :  Maxx Girl Pregonne [[id number]]   , Low Transverse   Patient doing well without significant complaint.   Nausea and vomiting resolved, tolerating po  Flatus yes no: Yes  F/C out yes no: Yes  Has ambulated with assistance without diff yes no: Yes  Adequate pain management  Breast feeding established    Vitals:  /66   Pulse 87   Temp 97.7 °F (36.5 °C) (Oral)   Resp 16   LMP 2024 (Exact Date)   SpO2 100%   Breastfeeding Unknown   Temp (24hrs), Av.8 °F (36.6 °C), Min:97.3 °F (36.3 °C), Max:98.4 °F (36.9 °C)      Last 24hr Input/Output:    Intake/Output Summary (Last 24 hours) at 3/15/2025 1024  Last data filed at 3/15/2025 0100  Gross per 24 hour   Intake 1800 ml   Output 1470 ml   Net 330 ml          Exam:       A,A&O x3                Patient without distress.     Lungs clear to Auscultation Bilat  Abdomen soft, expected tenderness      Bowel sounds present X 4 Quads  Fundus firm small Lochia Rubra  Wound dressing intact     Lower extremities are negative for swelling, cords or tenderness.    Labs:   Lab Results   Component Value Date/Time    WBC 12.8 03/15/2025 06:25 AM    WBC 9.3 2025 09:36 AM    WBC 9.1 2025 12:12 PM    WBC 7.5 10/01/2024 12:09 PM    WBC 16.0 2022 02:18 AM    WBC 11.4 2022 07:58 AM    HGB 8.1 03/15/2025 06:25 AM    HGB 11.3 2025 09:36 AM    HGB 10.4 2025 12:12 PM    HGB 12.4 10/01/2024 12:09 PM    HGB 8.6 2022 02:18 AM    HGB 12.5 2022 07:58 AM    HCT 25.9 03/15/2025 06:25 AM    HCT 35.4 2025 09:36 AM    HCT 32.9 2025 12:12 PM    HCT 38.3 10/01/2024 12:09 PM    HCT 25.6 2022 02:18 AM    HCT 38.3 2022 07:58 AM     03/15/2025 06:25 AM     2025 09:36 AM     2025 12:12 PM    PLT

## 2025-03-15 NOTE — LACTATION NOTE
This note was copied from a baby's chart.  Mom not available at the time of my visit. Encouraged dad to have mom call for assistance if needed.

## 2025-03-16 VITALS
DIASTOLIC BLOOD PRESSURE: 63 MMHG | RESPIRATION RATE: 16 BRPM | HEART RATE: 103 BPM | TEMPERATURE: 98.6 F | SYSTOLIC BLOOD PRESSURE: 113 MMHG | OXYGEN SATURATION: 99 %

## 2025-03-16 PROBLEM — Z3A.36 36 WEEKS GESTATION OF PREGNANCY: Status: RESOLVED | Noted: 2025-03-14 | Resolved: 2025-03-16

## 2025-03-16 LAB
ABO + RH BLD: NORMAL
BLD PROD TYP BPU: NORMAL
BLOOD BANK BLOOD PRODUCT EXPIRATION DATE: NORMAL
BLOOD BANK DISPENSE STATUS: NORMAL
BPU ID: NORMAL
FETAL SCREEN: NORMAL
UNIT DIVISION: 0
UNIT ISSUE DATE/TIME: NORMAL

## 2025-03-16 PROCEDURE — 6370000000 HC RX 637 (ALT 250 FOR IP): Performed by: OBSTETRICS & GYNECOLOGY

## 2025-03-16 PROCEDURE — APPNB30 APP NON BILLABLE TIME 0-30 MINS: Performed by: ADVANCED PRACTICE MIDWIFE

## 2025-03-16 RX ORDER — PSEUDOEPHEDRINE HCL 30 MG
100 TABLET ORAL 2 TIMES DAILY PRN
Qty: 10 CAPSULE | Refills: 0 | Status: SHIPPED | OUTPATIENT
Start: 2025-03-16 | End: 2025-03-23

## 2025-03-16 RX ORDER — OXYCODONE HYDROCHLORIDE 5 MG/1
5 TABLET ORAL EVERY 6 HOURS PRN
Qty: 5 TABLET | Refills: 0 | Status: SHIPPED | OUTPATIENT
Start: 2025-03-16 | End: 2025-03-23

## 2025-03-16 RX ORDER — IBUPROFEN 800 MG/1
800 TABLET, FILM COATED ORAL EVERY 8 HOURS PRN
Qty: 30 TABLET | Refills: 0 | Status: SHIPPED | OUTPATIENT
Start: 2025-03-16 | End: 2025-03-26

## 2025-03-16 RX ADMIN — IBUPROFEN 800 MG: 400 TABLET, FILM COATED ORAL at 10:20

## 2025-03-16 RX ADMIN — ACETAMINOPHEN 1000 MG: 500 TABLET ORAL at 10:20

## 2025-03-16 RX ADMIN — ACETAMINOPHEN 1000 MG: 500 TABLET ORAL at 02:18

## 2025-03-16 RX ADMIN — IBUPROFEN 800 MG: 400 TABLET, FILM COATED ORAL at 02:18

## 2025-03-16 ASSESSMENT — PAIN SCALES - GENERAL: PAINLEVEL_OUTOF10: 3

## 2025-03-16 NOTE — DISCHARGE INSTRUCTIONS
frequently referred to as the “baby blues.” These feelings are usually temporary, self-limiting, and usually level out by two weeks after delivery.  Postpartum depression is less common and more serious. It is characterized by feelings of depression, anxiety, worry, hopelessness, crying, feeling like you can't take care of the baby, etc. If you are feeling any of these things, please CALL US! We will help you! Rarely, it may also include thoughts of hurting yourself, others, or the baby. If you are experiencing these thoughts or feelings, seek help immediately. Call 911, go to the nearest emergency room, and contact our office.  These conditions do not reflect your feelings for you baby, your partner, or your abilities as a mother. You cannot control these feeling, and it is not your fault. Again, please call us if you are feeling any of these symptoms prior to your 6 weeks visit.     Call your doctor for the following:     Frequent urgency or burning upon urination that is painful  Temperature of 100.4F or above  Increasing pain that is not relieved by medication  Bloody or pus-like drainage from  incision  Bleeding stays heavy despite rest, saturating a pad in a hour or less  Passing many clots or passing clots larger than an egg (some clots are normal if you have been sitting or lying down for long periods of time)  Foul-smelling bleeding  Severe headache that is not relieved by a snack, nap, or medications. Visual disturbances like fireworks in your vision.  Red streaks or increased swelling of legs, painful red streaks on your breast.  Painful urination, constipation and increased pain or swelling or discharge with your incision.  If you feel extremely anxious or overwhelmed.  If you have thoughts of harming yourself and/or your baby.    Pain Management:     Take Acetaminophen (Tylenol) or Ibuprofen (Advil, Motrin), as directed for pain.   Use a warm Sitz bath 3 times daily to relieve episiotomy or

## 2025-03-16 NOTE — PROGRESS NOTES
Bedside shift change report given to ROLA Maldonado RN (oncoming nurse) by GERONIMO Mota RN (offgoing nurse). Report included the following information Nurse Handoff Report.    normal...

## 2025-03-16 NOTE — DISCHARGE SUMMARY
all relevant caregivers and prepare discharge records, prescriptions and referral forms.      Signed By:  KRISTIN Alaniz CNM     March 16, 2025, 10:19 AM

## 2025-03-16 NOTE — LACTATION NOTE
This note was copied from a baby's chart.  Mom states the baby had been latching and nursing but her nipples are sore so she began supplementing with formula. She said she plans to begin pumping when she goes home. Mom has no questions for lactation before discharge.

## 2025-03-16 NOTE — PROGRESS NOTES
Post-Operative  Day 2    Rocky Lilly     Assessment: Post-Op day 2, doing well  Desires early discharge home yes no: Yes    Plan:   1. Routine post-operative care    Information for the patient's :  Kendra Lilly [[id number]]   , Low Transverse     Patient doing well without significant complaint.   Nausea and vomiting resolved, tolerating po  Flatus yes no: Yes  Voiding: yes  BM yes no: No  Has ambulated with assistance without diff yes no: Yes  Adequate pain management  Breast feeding: both breast and bottle     Vitals:  /63   Pulse (!) 103   Temp 98.6 °F (37 °C) (Oral)   Resp 16   LMP 2024 (Exact Date)   SpO2 99%   Breastfeeding Unknown   Temp (24hrs), Av.4 °F (36.9 °C), Min:98.1 °F (36.7 °C), Max:98.6 °F (37 °C)        Exam:    A,A&O x3  Patient without distress.  Lungs clear to Auscultation Bilat  Abdomen soft, expected tenderness  Bowel sounds present X 4 Quads  Fundus: Firm, Midline at U/-1, appropriately tender.  Breasts soft, NT  Wound dressing intact  Lower extremities are negative cords or tenderness  Edema: trace to 1+ bilateral pedal edema    Labs:   Lab Results   Component Value Date/Time    WBC 12.8 03/15/2025 06:25 AM    HGB 8.1 03/15/2025 06:25 AM    HCT 25.9 03/15/2025 06:25 AM       No results found for this or any previous visit (from the past 24 hours).      A/P:  Rocky Lilly is a 34 y.o.  postpartum day Numbers 0-3: 2 @ s/p , Low Transverse.  Routine Post-Op care  Advance Diet and Activity  Postpartum Post Care Goals:Meeting all goals  Hemodynamics: stable   Pain: controlled with current medications  Encourage Ambulation  Breastfeeding: both breast and bottle   Lactation consult prn  Disposition: home Today and Postpartum Day 2    KRISTIN Alaniz CNM  3/16/2025

## 2025-03-17 LAB
ABO + RH BLD: NORMAL
BLD PROD TYP BPU: NORMAL
BLD PROD TYP BPU: NORMAL
BLOOD BANK DISPENSE STATUS: NORMAL
BLOOD BANK DISPENSE STATUS: NORMAL
BLOOD GROUP ANTIBODIES SERPL: NORMAL
BLOOD GROUP ANTIBODIES SERPL: NORMAL
BPU ID: NORMAL
BPU ID: NORMAL
CROSSMATCH RESULT: NORMAL
CROSSMATCH RESULT: NORMAL
SPECIMEN EXP DATE BLD: NORMAL
UNIT DIVISION: 0
UNIT DIVISION: 0

## 2025-03-20 ENCOUNTER — RESULTS FOLLOW-UP (OUTPATIENT)
Facility: HOSPITAL | Age: 35
End: 2025-03-20

## 2025-03-21 ENCOUNTER — POSTPARTUM VISIT (OUTPATIENT)
Age: 35
End: 2025-03-21

## 2025-03-21 VITALS
RESPIRATION RATE: 16 BRPM | DIASTOLIC BLOOD PRESSURE: 85 MMHG | BODY MASS INDEX: 38.63 KG/M2 | HEIGHT: 61 IN | HEART RATE: 86 BPM | TEMPERATURE: 98.6 F | OXYGEN SATURATION: 98 % | WEIGHT: 204.6 LBS | SYSTOLIC BLOOD PRESSURE: 133 MMHG

## 2025-03-21 DIAGNOSIS — Z98.890 POST-OPERATIVE STATE: Primary | ICD-10-CM

## 2025-03-21 PROCEDURE — 99024 POSTOP FOLLOW-UP VISIT: CPT | Performed by: OBSTETRICS & GYNECOLOGY

## 2025-03-21 NOTE — PROGRESS NOTES
Rocky Lilly is a 34 y.o. female returns for a routine post-partum post op visit     Chief Complaint   Patient presents with    Postpartum Care     1 week incision check       Postpartum Depression: Low Risk  (3/15/2025)    [place]  Depression Scale     Last EPDS Total Score: 1     Last EPDS Self Harm Result: Never         Type of delivery: repeat  section  Date of Delivery: 2025  Breastfeeding: yes  Bleeding Resolved: no        Problems: no problems    1. Have you been to the ER, urgent care clinic, or hospitalized since your last visit? No    2. Have you seen or consulted any other health care providers outside of the [facility] since your last visit? No    Examination chaperoned by Bev Poe RN.

## 2025-03-21 NOTE — PROGRESS NOTES
Rocky Lilly is a 34 y.o. female presents for a problem visit.    No chief complaint on file.    Patient's last menstrual period was 07/04/2024 (exact date).  Birth Control: {contraception:382892}.  Last Pap: {Nl/Abnl:52709::\"see report\"} obtained {Numbers; 1-5:33994510} year(s) ago.    The patient is reporting having: {Gyn Problem List:10254} for {numbers:6} {days/wks/mos/yrs:437247}.  She reports the symptoms are {BETTER/WORSE:30044}.  Aggravating factors include ***.  And alleviating factors include ***.      1. Have you been to the ER, urgent care clinic, or hospitalized since your last visit? {YES/NO:19726::\"Yes- When: ***. Where: ***.  Reason for visit: ***\"}    2. Have you seen or consulted any other health care providers outside of the [facility] since your last visit? {YES/NO:19726::\"Yes- When: ***. Where: ***.  Reason for visit: ***\"}

## 2025-03-28 ENCOUNTER — TELEPHONE (OUTPATIENT)
Age: 35
End: 2025-03-28

## 2025-03-28 NOTE — TELEPHONE ENCOUNTER
Called patient to let her know that we have to r/s her appt due to a meeting and let her know the new time and that if she needs to r/s she can give us a call back.

## 2025-04-29 ENCOUNTER — POSTPARTUM VISIT (OUTPATIENT)
Age: 35
End: 2025-04-29

## 2025-04-29 VITALS
OXYGEN SATURATION: 98 % | HEART RATE: 102 BPM | TEMPERATURE: 97.9 F | BODY MASS INDEX: 36.7 KG/M2 | WEIGHT: 194.4 LBS | DIASTOLIC BLOOD PRESSURE: 83 MMHG | HEIGHT: 61 IN | SYSTOLIC BLOOD PRESSURE: 123 MMHG | RESPIRATION RATE: 16 BRPM

## 2025-04-29 DIAGNOSIS — Z72.51 UNPROTECTED SEX: ICD-10-CM

## 2025-04-29 PROCEDURE — 0503F POSTPARTUM CARE VISIT: CPT | Performed by: OBSTETRICS & GYNECOLOGY

## 2025-04-29 RX ORDER — ACETAMINOPHEN AND CODEINE PHOSPHATE 120; 12 MG/5ML; MG/5ML
1 SOLUTION ORAL DAILY
Qty: 30 TABLET | Refills: 11 | Status: SHIPPED | OUTPATIENT
Start: 2025-04-29

## 2025-04-29 NOTE — PROGRESS NOTES
Rocky Lilly is a 34 y.o. female returns for a routine post-partum follow-up visit     No chief complaint on file.      Postpartum Depression: Low Risk  (3/15/2025)    [place]  Depression Scale     Last EPDS Total Score: 1     Last EPDS Self Harm Result: Never         Type of delivery: repeat  section  Date of Delivery: 2025  Breastfeeding: yes  Bleeding Resolved: yes  Birth Control: interested.  Last Pap: unsure        Problems: no problems    1. Have you been to the ER, urgent care clinic, or hospitalized since your last visit? No    2. Have you seen or consulted any other health care providers outside of the [facility] since your last visit? No    Patient declined chaperone for today's exam.

## 2025-04-29 NOTE — PROGRESS NOTES
Postpartum Visit    Rocky Lilly is a 34 y.o.  presenting for her postpartum visit.          Type of delivery: repeat  section  Date of Delivery: 2025  Breastfeeding: yes  Bleeding Resolved: yes  Birth Control: interested.  Last Pap: unsure      Past Medical History:   Diagnosis Date    Asthma     Diabetes mellitus (HCC)     Fibroids     Gallstones     History of seasonal allergies     Prediabetes        Past Surgical History:   Procedure Laterality Date     SECTION  2022     SECTION N/A 3/14/2025     SECTION (E R A S) performed by Vianey Arroyo MD at [facility] OR    CHOLECYSTECTOMY      HEENT      WISDOM TEETH    MYOMECTOMY Bilateral        Family History   Problem Relation Age of Onset    Cancer Maternal Aunt         BREAST CA    Cancer Maternal Grandmother         BREAST CA    Hypertension Maternal Grandmother     Diabetes Maternal Grandmother     Breast Cancer Maternal Grandmother     Cancer Father         CA NECK    Asthma Sister     Hypertension Sister     Stroke Neg Hx     Malig Hypertherm Neg Hx     Pseudochol. Deficiency Neg Hx     Delayed Awakening Neg Hx     Post-op Nausea/Vomiting Neg Hx     Heart Disease Neg Hx        Social History     Socioeconomic History    Marital status: Single     Spouse name: Not on file    Number of children: Not on file    Years of education: Not on file    Highest education level: Not on file   Occupational History    Not on file   Tobacco Use    Smoking status: Never    Smokeless tobacco: Never   Vaping Use    Vaping status: Never Used   Substance and Sexual Activity    Alcohol use: Not Currently     Alcohol/week: 1.0 - 2.0 standard drink of alcohol     Types: 1 - 2 Glasses of wine per week    Drug use: Never    Sexual activity: Yes     Partners: Male   Other Topics Concern    Not on file   Social History Narrative    Not on file     Social Drivers of Health     Financial Resource Strain: Low Risk  (10/1/2024)

## 2025-05-05 ENCOUNTER — LAB (OUTPATIENT)
Age: 35
End: 2025-05-05

## 2025-05-05 DIAGNOSIS — O24.419 GESTATIONAL DIABETES MELLITUS (GDM), ANTEPARTUM, GESTATIONAL DIABETES METHOD OF CONTROL UNSPECIFIED: ICD-10-CM

## 2025-05-05 LAB
GLUCOSE 1 HOUR: 160 MG/DL
GLUCOSE P FAST SERPL-MCNC: 96 MG/DL (ref 65–100)
GLUCOSE TOLERANCE TEST 2 HOUR: 129 MG/DL (ref 65–140)
GLUCOSE TOLERANCE: NORMAL
GLUCOSE, 1/2 HOUR: NORMAL MG/DL

## 2025-05-05 NOTE — PROGRESS NOTES
Telephone order obtained from Vianey Arroyo MD for 2 hr gtt and a diagnosis of postpartum & gdm. Order read back to MD. Orders signed by this writer and sent for co-sign to MD.

## 2025-05-06 ENCOUNTER — RESULTS FOLLOW-UP (OUTPATIENT)
Age: 35
End: 2025-05-06

## 2025-07-09 ENCOUNTER — OFFICE VISIT (OUTPATIENT)
Age: 35
End: 2025-07-09
Payer: MEDICAID

## 2025-07-09 VITALS
WEIGHT: 194 LBS | SYSTOLIC BLOOD PRESSURE: 130 MMHG | OXYGEN SATURATION: 97 % | BODY MASS INDEX: 36.66 KG/M2 | DIASTOLIC BLOOD PRESSURE: 86 MMHG | HEART RATE: 94 BPM

## 2025-07-09 DIAGNOSIS — Z01.419 WELL WOMAN EXAM: Primary | ICD-10-CM

## 2025-07-09 PROCEDURE — 99395 PREV VISIT EST AGE 18-39: CPT | Performed by: OBSTETRICS & GYNECOLOGY

## 2025-07-09 RX ORDER — ACETAMINOPHEN AND CODEINE PHOSPHATE 120; 12 MG/5ML; MG/5ML
1 SOLUTION ORAL DAILY
Qty: 30 TABLET | Refills: 11 | Status: SHIPPED | OUTPATIENT
Start: 2025-07-09

## 2025-07-09 NOTE — PROGRESS NOTES
Annual exam    Rocky Lilly is a 34 y.o.  presenting for annual exam. Her main concerns today include annual well women exam.    She declines a chaperone during the gynecologic exam today.       LMP: 2025 Estimated  Her periods are moderate in flow and often irregular with no apparent pattern.  She does not have dysmenorrhea.  She is breastfeeding  Problems: no problems  Birth Control: pill  Last Pap: unknown all wnl per pt  She does not have a history of TERRI 2, 3 or cervical cancer.   With regard to the Gardisil vaccine, she has not received it yet     Past Medical History:   Diagnosis Date    Asthma     Diabetes mellitus (HCC)     Fibroids     Gallstones     History of seasonal allergies     Prediabetes        Past Surgical History:   Procedure Laterality Date     SECTION  2022     SECTION N/A 3/14/2025     SECTION (E R A S) performed by Vianey Arroyo MD at [facility]    CHOLECYSTECTOMY      HEENT      WISDOM TEETH    MYOMECTOMY Bilateral        Family History   Problem Relation Age of Onset    Cancer Father         CA NECK    Asthma Sister     Hypertension Sister     Cancer Maternal Grandmother         BREAST CA    Hypertension Maternal Grandmother     Diabetes Maternal Grandmother     Breast Cancer Maternal Grandmother     Cancer Maternal Aunt         BREAST CA    Breast Cancer Maternal Aunt     Stroke Neg Hx     Malig Hypertherm Neg Hx     Pseudochol. Deficiency Neg Hx     Delayed Awakening Neg Hx     Post-op Nausea/Vomiting Neg Hx     Heart Disease Neg Hx        Social History     Socioeconomic History    Marital status: Single     Spouse name: Not on file    Number of children: Not on file    Years of education: Not on file    Highest education level: Not on file   Occupational History    Not on file   Tobacco Use    Smoking status: Never    Smokeless tobacco: Never   Vaping Use    Vaping status: Never Used   Substance and Sexual Activity    Alcohol use: Not

## 2025-07-09 NOTE — PROGRESS NOTES
Rocky Lilly is a 34 y.o. female returns for an annual exam     Chief Complaint   Patient presents with    Annual Exam       LMP: 6/25/2025 Estimated  Her periods are moderate in flow and often irregular with no apparent pattern.  She does not have dysmenorrhea.  She is breastfeeding  Problems: no problems  Birth Control: pill  Last Pap: unknown all wnl per pt  She does not have a history of TERRI 2, 3 or cervical cancer.   With regard to the Gardisil vaccine, she has not received it yet      1. Have you been to the ER, urgent care clinic, or hospitalized since your last visit? No    2. Have you seen or consulted any other health care providers outside of the [facility] since your last visit? No

## 2025-07-12 LAB
C TRACH RRNA CVX QL NAA+PROBE: NEGATIVE
CYTOLOGIST CVX/VAG CYTO: NORMAL
CYTOLOGY CVX/VAG DOC CYTO: NORMAL
CYTOLOGY CVX/VAG DOC THIN PREP: NORMAL
DX ICD CODE: NORMAL
HPV GENOTYPE REFLEX: NORMAL
HPV I/H RISK 4 DNA CVX QL PROBE+SIG AMP: NEGATIVE
N GONORRHOEA RRNA CVX QL NAA+PROBE: NEGATIVE
OTHER STN SPEC: NORMAL
SERVICE CMNT-IMP: NORMAL
STAT OF ADQ CVX/VAG CYTO-IMP: NORMAL
T VAGINALIS RRNA SPEC QL NAA+PROBE: NEGATIVE

## (undated) DEVICE — SUTURE VICRYL + SZ 0 L36IN ABSRB VLT L40MM CT 1/2 CIR TAPR VCP358H

## (undated) DEVICE — TROCAR: Brand: KII SLEEVE

## (undated) DEVICE — TIP CLEANER: Brand: VALLEYLAB

## (undated) DEVICE — VCARE MEDIUM, UTERINE MANIPULATOR, VAGINAL-CERVICAL-AHLUWALIA'S-RETRACTOR-ELEVATOR: Brand: VCARE

## (undated) DEVICE — SUTURE SZ 0 27IN 5/8 CIR UR-6  TAPER PT VIOLET ABSRB VICRYL J603H

## (undated) DEVICE — SYR 3ML LL TIP 1/10ML GRAD --

## (undated) DEVICE — ELECTRO LUBE IS A SINGLE PATIENT USE DEVICE THAT IS INTENDED TO BE USED ON ELECTROSURGICAL ELECTRODES TO REDUCE STICKING.: Brand: KEY SURGICAL ELECTRO LUBE

## (undated) DEVICE — GLOVE SURG SZ 65 THK91MIL LTX FREE SYN POLYISOPRENE

## (undated) DEVICE — DRAPE FLD WRM W44XL66IN C6L FOR INTRATEMP SYS THERMABASIN

## (undated) DEVICE — TOTAL TRAY, 16FR 10ML SIL FOLEY, URN: Brand: MEDLINE

## (undated) DEVICE — GLOVE SURG SZ 8 L12IN FNGR THK79MIL GRN LTX FREE

## (undated) DEVICE — SOLUTION IRRIG 1000ML 0.9% SOD CHL USP POUR PLAS BTL

## (undated) DEVICE — VISUALIZATION SYSTEM: Brand: CLEARIFY

## (undated) DEVICE — INFECTION CONTROL KIT SYS

## (undated) DEVICE — STRIP,CLOSURE,WOUND,MEDI-STRIP,1/2X4: Brand: MEDLINE

## (undated) DEVICE — STRAP,POSITIONING,KNEE/BODY,FOAM,4X60": Brand: MEDLINE

## (undated) DEVICE — PAD,ABDOMINAL,5"X9",ST,LF,25/BX: Brand: MEDLINE INDUSTRIES, INC.

## (undated) DEVICE — SYRINGE IRRIG 60ML SFT PLIABLE BLB EZ TO GRP 1 HND USE W/

## (undated) DEVICE — ROCKER SWITCH PENCIL HOLSTER: Brand: VALLEYLAB

## (undated) DEVICE — SUTURE VCRL SZ 0 L36IN ABSRB VLT L40MM CT 1/2 CIR J358H

## (undated) DEVICE — MASTISOL ADHESIVE LIQ 2/3ML

## (undated) DEVICE — SOLUTION IRRIG 1000ML STRL H2O USP PLAS POUR BTL

## (undated) DEVICE — EVAC SMOKE SEECLEAR XCL -- SEE CLEAR

## (undated) DEVICE — TRAY PREP DRY W/ PREM GLV 2 APPL 6 SPNG 2 UNDPD 1 OVERWRAP

## (undated) DEVICE — TROCAR: Brand: KII FIOS FIRST ENTRY

## (undated) DEVICE — STERILE POLYISOPRENE POWDER-FREE SURGICAL GLOVES: Brand: PROTEXIS

## (undated) DEVICE — GOWN,AURORA,FABRIC-REINFORCED,X-LARGE: Brand: MEDLINE

## (undated) DEVICE — GLOVE SURG SZ 6 THK91MIL LTX FREE SYN POLYISOPRENE ANTI

## (undated) DEVICE — GLOVE SURG SZ 7 L12IN FNGR THK79MIL GRN LTX FREE

## (undated) DEVICE — Device

## (undated) DEVICE — NEEDLE HYPO 25GA L1.5IN BVL ORIENTED ECLIPSE

## (undated) DEVICE — SURGICAL PROCEDURE PACK GYN LAPAROSCOPY CUST SMH LF

## (undated) DEVICE — TOWEL SURG W17XL27IN STD BLU COT NONFENESTRATED PREWASHED

## (undated) DEVICE — GOWN,SIRUS,FABRNF,XL,20/CS: Brand: MEDLINE

## (undated) DEVICE — SUTURE MCRYL SZ 4-0 L27IN ABSRB UD L19MM PS-2 1/2 CIR PRIM Y426H

## (undated) DEVICE — DRESSING SIL W4XL5IN ANTIBACT GELLING FBR CYTOFORM

## (undated) DEVICE — PENCIL SMK EVAC 10 FT BLADE ELECTRD ROCKER FOR TELSCP

## (undated) DEVICE — AGENT HEMSTAT 5GM ARISTA AH

## (undated) DEVICE — SUTURE VICRYL + 1 L36IN ABSRB VLT CT-1 L36MM 1/2 CIR VCP347H

## (undated) DEVICE — ATTACHMENT SMK 3/8INX10FT VALLEYLAB

## (undated) DEVICE — SUTURE MONOCRYL SZ0 L36IN VIO ABSORB CT-1 NDL HALF CIR MONOCRYL PLUS

## (undated) DEVICE — REM POLYHESIVE ADULT PATIENT RETURN ELECTRODE: Brand: VALLEYLAB

## (undated) DEVICE — SUTURE VCRL 0 L27IN ABSRB VLT CT L40MM 1/2 CIR TAPERPOINT J352H

## (undated) DEVICE — 15MM BATTERY POWERED MORCELLATOR SYSTEM WITH OBTURATOR: Brand: LINA XCISE™, LAPAROSCOPIC MORCELLATOR

## (undated) DEVICE — PREP PAD BNS: Brand: CONVERTORS

## (undated) DEVICE — TOWEL,OR,DSP,ST,BLUE,STD,2/PK,40PK/CS: Brand: MEDLINE

## (undated) DEVICE — CANISTER, RIGID, 3000CC: Brand: MEDLINE INDUSTRIES, INC.

## (undated) DEVICE — 3M™ MEDIPORE™ H SOFT CLOTH TAPE 2862S: Brand: 3M™ MEDIPORE™

## (undated) DEVICE — C-SECTION-SFMC: Brand: MEDLINE INDUSTRIES, INC.

## (undated) DEVICE — PREP SKN CHLRAPRP APL 26ML STR --

## (undated) DEVICE — APPLICATOR MEDICATED 26 CC SOLUTION HI LT ORNG CHLORAPREP

## (undated) DEVICE — SOLUTION IRRIGATION NACL 0.9% 1000 ML FLX CONTAINER

## (undated) DEVICE — TROCAR SITE CLOSURE DEVICE: Brand: ENDO CLOSE

## (undated) DEVICE — SOL IRR SOD CL 0.9% 1000ML BTL --

## (undated) DEVICE — NEEDLE HYPO 18GA L1.5IN PNK S STL HUB POLYPR SHLD REG BVL

## (undated) DEVICE — SYS PNEUMOLINER CONTAINMENT

## (undated) DEVICE — SUTURE STRATAFIX SPRL PDS + SZ 2-0 L9IN ABSRB VLT CT-1 SXPP1B456

## (undated) DEVICE — DECANTER BAG 9": Brand: MEDLINE INDUSTRIES, INC.

## (undated) DEVICE — DERMABOND SKIN ADH 0.7ML -- DERMABOND ADVANCED 12/BX

## (undated) DEVICE — HANDLE LT SNAP ON ULT DURABLE LENS FOR TRUMPF ALC DISPOSABLE

## (undated) DEVICE — SUTURE MONOCRYL SZ 3-0 L27IN ABSRB UD L60MM KS STR REV CUT Y523H

## (undated) DEVICE — STAPLER SKN INSORB 30 COUNT --

## (undated) DEVICE — C-SECTION II-LF: Brand: MEDLINE INDUSTRIES, INC.